# Patient Record
Sex: MALE | Race: WHITE | NOT HISPANIC OR LATINO | Employment: FULL TIME | URBAN - METROPOLITAN AREA
[De-identification: names, ages, dates, MRNs, and addresses within clinical notes are randomized per-mention and may not be internally consistent; named-entity substitution may affect disease eponyms.]

---

## 2019-02-20 ENCOUNTER — CONVERSION ENCOUNTER (OUTPATIENT)
Dept: CARDIOLOGY | Facility: CLINIC | Age: 39
End: 2019-02-20

## 2019-02-20 ENCOUNTER — OFFICE VISIT CONVERTED (OUTPATIENT)
Dept: CARDIOLOGY | Facility: CLINIC | Age: 39
End: 2019-02-20
Attending: INTERNAL MEDICINE

## 2019-08-22 PROBLEM — K21.9 GASTROESOPHAGEAL REFLUX DISEASE, ESOPHAGITIS PRESENCE NOT SPECIFIED: Status: ACTIVE | Noted: 2019-08-22

## 2019-08-22 PROBLEM — I48.91 ATRIAL FIBRILLATION, UNSPECIFIED TYPE (HCC): Status: ACTIVE | Noted: 2019-08-22

## 2019-08-22 PROBLEM — E66.3 OVERWEIGHT (BMI 25.0-29.9): Status: ACTIVE | Noted: 2019-08-22

## 2019-08-23 PROCEDURE — 81001 URINALYSIS AUTO W/SCOPE: CPT | Performed by: FAMILY MEDICINE

## 2020-10-28 ENCOUNTER — OFFICE VISIT CONVERTED (OUTPATIENT)
Dept: FAMILY MEDICINE CLINIC | Facility: CLINIC | Age: 40
End: 2020-10-28
Attending: NURSE PRACTITIONER

## 2020-10-28 ENCOUNTER — HOSPITAL ENCOUNTER (OUTPATIENT)
Dept: LAB | Facility: HOSPITAL | Age: 40
Discharge: HOME | End: 2020-10-28
Attending: NURSE PRACTITIONER

## 2020-10-28 LAB
ALBUMIN SERPL-MCNC: 4.7 G/DL (ref 3.5–5)
ALBUMIN/GLOB SERPL: 1.5 {RATIO} (ref 1.4–2.6)
ALP SERPL-CCNC: 112 U/L (ref 53–128)
ALT SERPL-CCNC: 20 U/L (ref 10–40)
ANION GAP SERPL CALC-SCNC: 16 MMOL/L (ref 8–19)
AST SERPL-CCNC: 21 U/L (ref 15–50)
BASOPHILS # BLD AUTO: 0.06 10*3/UL (ref 0–0.2)
BASOPHILS NFR BLD AUTO: 1 % (ref 0–3)
BILIRUB SERPL-MCNC: 0.48 MG/DL (ref 0.2–1.3)
BUN SERPL-MCNC: 14 MG/DL (ref 5–25)
BUN/CREAT SERPL: 13 {RATIO} (ref 6–20)
CALCIUM SERPL-MCNC: 9.4 MG/DL (ref 8.7–10.4)
CHLORIDE SERPL-SCNC: 104 MMOL/L (ref 99–111)
CHOLEST SERPL-MCNC: 155 MG/DL (ref 107–200)
CHOLEST/HDLC SERPL: 4.2 {RATIO} (ref 3–6)
CONV ABS IMM GRAN: 0.01 10*3/UL (ref 0–0.2)
CONV CO2: 26 MMOL/L (ref 22–32)
CONV IMMATURE GRAN: 0.2 % (ref 0–1.8)
CONV TOTAL PROTEIN: 7.9 G/DL (ref 6.3–8.2)
CREAT UR-MCNC: 1.05 MG/DL (ref 0.7–1.2)
DEPRECATED RDW RBC AUTO: 40.1 FL (ref 35.1–43.9)
EOSINOPHIL # BLD AUTO: 0.14 10*3/UL (ref 0–0.7)
EOSINOPHIL # BLD AUTO: 2.3 % (ref 0–7)
ERYTHROCYTE [DISTWIDTH] IN BLOOD BY AUTOMATED COUNT: 12.1 % (ref 11.6–14.4)
GFR SERPLBLD BASED ON 1.73 SQ M-ARVRAT: >60 ML/MIN/{1.73_M2}
GLOBULIN UR ELPH-MCNC: 3.2 G/DL (ref 2–3.5)
GLUCOSE SERPL-MCNC: 75 MG/DL (ref 70–99)
HCT VFR BLD AUTO: 45.8 % (ref 42–52)
HDLC SERPL-MCNC: 37 MG/DL (ref 40–60)
HGB BLD-MCNC: 15.1 G/DL (ref 14–18)
LDLC SERPL CALC-MCNC: 94 MG/DL (ref 70–100)
LYMPHOCYTES # BLD AUTO: 1.71 10*3/UL (ref 1–5)
LYMPHOCYTES NFR BLD AUTO: 28.1 % (ref 20–45)
MCH RBC QN AUTO: 29.9 PG (ref 27–31)
MCHC RBC AUTO-ENTMCNC: 33 G/DL (ref 33–37)
MCV RBC AUTO: 90.7 FL (ref 80–96)
MONOCYTES # BLD AUTO: 0.6 10*3/UL (ref 0.2–1.2)
MONOCYTES NFR BLD AUTO: 9.9 % (ref 3–10)
NEUTROPHILS # BLD AUTO: 3.57 10*3/UL (ref 2–8)
NEUTROPHILS NFR BLD AUTO: 58.5 % (ref 30–85)
NRBC CBCN: 0 % (ref 0–0.7)
OSMOLALITY SERPL CALC.SUM OF ELEC: 293 MOSM/KG (ref 273–304)
PLATELET # BLD AUTO: 337 10*3/UL (ref 130–400)
PMV BLD AUTO: 8.7 FL (ref 9.4–12.4)
POTASSIUM SERPL-SCNC: 4.2 MMOL/L (ref 3.5–5.3)
RBC # BLD AUTO: 5.05 10*6/UL (ref 4.7–6.1)
SODIUM SERPL-SCNC: 142 MMOL/L (ref 135–147)
TRIGL SERPL-MCNC: 122 MG/DL (ref 40–150)
TSH SERPL-ACNC: 1.71 M[IU]/L (ref 0.27–4.2)
VLDLC SERPL-MCNC: 24 MG/DL (ref 5–37)
WBC # BLD AUTO: 6.09 10*3/UL (ref 4.8–10.8)

## 2020-10-29 LAB — 25(OH)D3 SERPL-MCNC: 27.3 NG/ML (ref 30–100)

## 2021-03-31 ENCOUNTER — OFFICE VISIT CONVERTED (OUTPATIENT)
Dept: FAMILY MEDICINE CLINIC | Facility: CLINIC | Age: 41
End: 2021-03-31
Attending: NURSE PRACTITIONER

## 2021-05-10 NOTE — H&P
History and Physical      Patient Name: Daniele Bowers   Patient ID: [id number]   Sex: Male   YOB: 1980    Primary Care Provider: Penelope GLOVER   Referring Provider: Penelope GLOVER    Visit Date: October 28, 2020    Provider: ADALBERTO Boyer   Location: [facility]   Location Address: [address] [place] [address], [address]  [place], [place]  [id number]   Location Phone: [phone]          Chief Complaint  · New patient - Establish care  · Flu shot      History Of Present Illness  Daniele Bowers is a 40 year old /White male who presents for evaluation and treatment of:      New patient to re-establish care.  Patient previously seen at [facility]".  Patient needs refills on all his medications.    PMH:  GERD, Anxiety, HTN, Afib    GERD:  Takes Omeprazole with good control of symptoms, pt states he does not have to take any supplemental medication, unless he eats something bad.     Anxiety:  Takes Venlafaxine 75mg one daily and 37.5mg one daily  with good control of symptoms.  Patient states he has tried other anxiety medications but always goes back to Venlafaxine d/t it controls anxiety attacks best.    HTN/Afib:  Takes Losartan, Sotalol, ASA.  Patient's BP in clinic today is 120/76.  Patient denies CP, SOA.  Patient was seeing Dr. Rivas, cardiology, in the past.  Patient has not seen him in some time d/t he was living in [place] and recently moved back.  Patient requesting referral. Pt does check  his b/p at home and it runs normal. Pt reports history of A-fib in past. He is currently managed by beta blocker and asa. He denies feeling any palpitations or rapid heart rate.       Past Medical History  Disease Name Date Onset Notes   Allergies --  --    Anxiety --  --    GERD (gastroesophageal reflux disease) --  --    Hypertension --  --          Past Surgical History  Procedure Name Date Notes   Appendectomy --  --          Medication List  Name Date Started Instructions    aspirin 81 mg oral tablet,chewable  chew 1 tablet (81 mg) by oral route once daily   losartan 50 mg oral tablet 10/28/2020 take 1 tablet (50 mg) by oral route once daily for 90 days   omeprazole 40 mg oral capsule,delayed release(DR/EC) 10/28/2020 take 1 capsule (40 mg) by oral route once daily before a meal for 90 days   sotalol 80 mg oral tablet 10/28/2020 take 1 tablet by oral route daily for 90 days   venlafaxine 37.5 mg oral capsule,extended release 24hr 10/28/2020 take 1 capsule (37.5 mg) by oral route once daily with food for 90 days   venlafaxine 75 mg oral capsule,extended release 24hr 10/28/2020 take 1 capsule (75 mg) by oral route once daily for 90 days         Allergy List  Allergen Name Date Reaction Notes   NO KNOWN DRUG ALLERGIES --  --  --        Allergies Reconciled  Family Medical History  Disease Name Relative/Age Notes   Family history of heart disease  --    Family history of diabetes mellitus Father/  Mother/   --          Social History  Finding Status Start/Stop Quantity Notes   Alcohol Never --/-- --  --    Exercises 3 to 4 times per week --  --/-- --  --     --  --/-- --  --    Tobacco Never --/-- --  --          Review of Systems  · Constitutional  o Denies  o : fatigue  · Eyes  o Denies  o : blurred vision, changes in vision  · HENT  o Denies  o : headaches  · Cardiovascular  o Denies  o : chest pain, irregular heart beats, rapid heart rate, dyspnea on exertion  · Respiratory  o Denies  o : shortness of breath, wheezing, cough  · Gastrointestinal  o Denies  o : nausea, vomiting, diarrhea, constipation, abdominal pain, blood in stools, melena  · Genitourinary  o Denies  o : frequency, dysuria, hematuria  · Integument  o Denies  o : rash, new skin lesions  · Musculoskeletal  o Denies  o : joint pain, joint swelling, muscle pain  · Endocrine  o Denies  o : polyuria, polydipsia      Vitals  Date Time BP Position Site L\R Cuff Size HR RR TEMP (F) WT  HT  BMI kg/m2 BSA m2 O2 Sat FR  "L/min FiO2 HC       10/28/2020 08:56 /76 Sitting    57 - R  97.8 223lbs 4oz 6'  1\" 29.45 2.28 100 %  21%          Physical Examination  · Constitutional  o Appearance  o : well developed, well-nourished, no acute distress  · Head and Face  o Head  o : normocephalic, atraumatic  · Neck  o Inspection/Palpation  o : normal appearance, no masses or tenderness, trachea midline  o Thyroid  o : gland size normal, nontender, no nodules or masses present on palpation  · Respiratory  o Respiratory Effort  o : breathing unlabored  o Inspection of Chest  o : chest rise symmetric bilaterally  o Auscultation of Lungs  o : clear to auscultation bilaterally throughout inspiration and expiration  · Cardiovascular  o Heart  o :   § Auscultation of Heart  § : regular rate and rhythm, no murmurs, gallops or rubs  o Peripheral Vascular System  o :   § Extremities  § : no edema  · Lymphatic  o Neck  o : no cervical lymphadenopathy, no supraclavicular lymphadenopathy  · Psychiatric  o Mood and Affect  o : mood normal, affect appropriate          Assessment  · Screening for depression     V79.0/Z13.89  · Need for influenza vaccination     V04.81/Z23  · Screening for lipid disorders     V77.91/Z13.220  · Anxiety disorder     300.00/F41.9  · Essential hypertension     401.9/I10  · GERD (gastroesophageal reflux disease)     530.81/K21.9  · Establishing care with new doctor, encounter for     V65.8/Z76.89  · Routine lab draw     V72.60/Z01.89  · Atrial fibrillation     427.31/I48.91    Problems Reconciled  Plan  · Orders  o ACO-18: Negative screen for clinical depression using a standardized tool () - V79.0/Z13.89 - 10/28/2020   0  o Immunization Admin Fee (Single) ([facility]) (92592) - V04.81/Z23 - 10/28/2020  o Fluzone Quadrivalent Vaccine, age 6 months + (17615) - V04.81/Z23 - 10/28/2020   Vaccine - Influenza; Dose: 0.5; Site: Right Upper Arm; Route: Intramuscular; Date: 10/28/2020 09:26:00; Exp: 06/30/2021; Lot: [id number]; Mfg: [employer] " pasteur; TradeName: Fluzone Quadrivalent; Administered By: Thor Garrido MA; Comment: Patient tolerated injection well with no adverse reaction.  o ACO-14: Influenza immunization administered or previously received () - V04.81/Z23 - 10/28/2020  o Physical, Primary Care Panel (CBC, CMP, Lipid, TSH) [facility] (77586, 69086, 92544, 81250) - V72.60/Z01.89, V65.8/Z76.89, V77.91/Z13.220, 401.9/I10 - 10/28/2020  o ACO-39: Current medications updated and reviewed (1159F, ) - - 10/28/2020  o ACO-18: Negative screen for clinical depression using a standardized tool () - - 10/28/2020  o ACO-14: Influenza immunization administered or previously received [facility] () - - 10/28/2020  o CARDIOLOGY CONSULTATION (CARDI) - 401.9/I10 - 10/28/2020   Dr. IMAN Rivas, [facility]  · Medications  o losartan 50 mg oral tablet   SIG: take 1 tablet (50 mg) by oral route once daily for 90 days   DISP: (90) Tablet with 0 refills  Prescribed on 10/28/2020     o omeprazole 40 mg oral capsule,delayed release(/EC)   SIG: take 1 capsule (40 mg) by oral route once daily before a meal for 90 days   DISP: (90) Capsule with 0 refills  Prescribed on 10/28/2020     o sotalol 80 mg oral tablet   SIG: take 1 tablet by oral route daily for 90 days   DISP: (90) Tablet with 0 refills  Prescribed on 10/28/2020     o venlafaxine 37.5 mg oral capsule,extended release 24hr   SIG: take 2 capsules (75 mg) by oral route once daily for 90 days   DISP: (180) Capsule with 0 refills  Prescribed on 10/28/2020     o venlafaxine 75 mg oral capsule,extended release 24hr   SIG: take 1 capsule (75 mg) by oral route once daily for 90 days   DISP: (90) Capsule with 0 refills  Prescribed on 10/28/2020     o Medications have been Reconciled  o Transition of Care or Provider Policy  · Instructions  o Depression Screen completed and scanned into the EMR under the designated folder within the patient's documents.  o Today's PHQ-9 result is _0__  o Patient advised  to monitor blood pressure (B/P) at home and journal readings. Patient informed that a B/P reading at home of more than 130/80 is considered hypertension. For readings greater rbng825/90 or higher patient is advised to follow up in the office with readings for management. Patient advised to limit sodium intake.  o Patient is taking medications as prescribed and doing well.   o Patient was educated/instructed on their diagnosis, treatment and medications prior to discharge from the clinic today.  o Call the office with any concerns or questions.  o Discussed Covid-19 precautions including, but not limited to, social distancing, avoid touching your face, and hand washing.   o Electronically Identified Patient Education Materials Provided Electronically  · Disposition  o Follow Up in 6 months  o Care Transition  o SCOTTIE Sent            Electronically Signed by: ADALBERTO Boyer -Author on October 28, 2020 09:49:36 AM

## 2021-05-14 VITALS
OXYGEN SATURATION: 100 % | HEIGHT: 73 IN | DIASTOLIC BLOOD PRESSURE: 76 MMHG | SYSTOLIC BLOOD PRESSURE: 120 MMHG | TEMPERATURE: 97.8 F | WEIGHT: 223.25 LBS | HEART RATE: 57 BPM | BODY MASS INDEX: 29.59 KG/M2

## 2021-05-14 VITALS
BODY MASS INDEX: 28.83 KG/M2 | HEIGHT: 73 IN | TEMPERATURE: 97.4 F | SYSTOLIC BLOOD PRESSURE: 101 MMHG | DIASTOLIC BLOOD PRESSURE: 63 MMHG | OXYGEN SATURATION: 98 % | WEIGHT: 217.5 LBS | HEART RATE: 54 BPM

## 2021-05-14 NOTE — PROGRESS NOTES
Progress Note      Patient Name: Daniele Bowers   Patient ID: [id number]   Sex: Male   YOB: 1980    Primary Care Provider: Pneelope GLOVER   Referring Provider: Penelope GLOVER    Visit Date: March 31, 2021    Provider: ADALBERTO Boyer   Location: [facility]   Location Address: [address] [place] [address], [address]  [place], [place]  [id number]   Location Phone: [phone]          Chief Complaint  · Follow up - HTN/Afib, Anxiety, GERD      History Of Present Illness  Daniele Bowers is a 40 year old /White male who presents for evaluation and treatment of:      Follow up on HTN/Afib, Anxiety, GERD for medication refills.    HTN/Afib:  Takes Losartan, Sotalol, ASA.  Patient's BP in clinic today is 101/63.  Patient monitors BP at home with readings of 110s/70s.  Patient denies CP, SOA.    Anxiety:  Takes Venlafaxine with good control of symptoms.  pt currently on 75mg and one 37.5mg daily and states this controls his symptoms well.     GERD:  Takes Omeprazole with good control of symptoms.  Patient never has to take OTC medications for break through reflux.    pt is doing well on all medications and has no complaints.           Past Medical History  Disease Name Date Onset Notes   Allergies --  --    Anxiety --  --    GERD (gastroesophageal reflux disease) --  --    Hypertension --  --          Past Surgical History  Procedure Name Date Notes   Appendectomy --  --          Medication List  Name Date Started Instructions   aspirin 81 mg oral tablet,chewable  chew 1 tablet (81 mg) by oral route once daily   losartan 50 mg oral tablet 03/31/2021 take 1 tablet (50 mg) by oral route once daily for 90 days   omeprazole 40 mg oral capsule,delayed release(DR/EC) 03/31/2021 take 1 capsule (40 mg) by oral route once daily before a meal for 90 days   sotalol 80 mg oral tablet 03/31/2021 take 1 tablet by oral route daily for 90 days   venlafaxine 37.5 mg oral capsule,extended release  "24hr 03/31/2021 take 1 capsule (37.5 mg) by oral route once daily with food for 90 days   venlafaxine 75 mg oral capsule,extended release 24hr 03/31/2021 take 1 capsule (75 mg) by oral route once daily for 90 days         Allergy List  Allergen Name Date Reaction Notes   NO KNOWN DRUG ALLERGIES --  --  --        Allergies Reconciled  Family Medical History  Disease Name Relative/Age Notes   Family history of heart disease  --    Family history of diabetes mellitus Father/  Mother/   --          Social History  Finding Status Start/Stop Quantity Notes   Alcohol Never --/-- --  --    Exercises 3 to 4 times per week --  --/-- --  --     --  --/-- --  --    Tobacco Never --/-- --  --          Immunizations  NameDate Admin Mfg Trade Name Lot Number Route Inj VIS Given VIS Publication   Ccwaieuwo00/28/2020 [facility] Fluzone Quadrivalent [id number] IM  10/28/2020 08/15/2019   Comments: Patient tolerated injection well with no adverse reaction.         Review of Systems  · Constitutional  o Denies  o : fatigue  · Eyes  o Denies  o : blurred vision, changes in vision  · HENT  o Denies  o : headaches  · Cardiovascular  o Denies  o : chest pain, irregular heart beats, rapid heart rate, dyspnea on exertion  · Respiratory  o Denies  o : shortness of breath, wheezing, cough  · Gastrointestinal  o Denies  o : nausea, vomiting, diarrhea, constipation, abdominal pain, blood in stools, melena  · Genitourinary  o Denies  o : frequency, dysuria, hematuria  · Integument  o Denies  o : rash, new skin lesions  · Musculoskeletal  o Denies  o : joint pain, joint swelling, muscle pain  · Endocrine  o Denies  o : polyuria, polydipsia      Vitals  Date Time BP Position Site L\R Cuff Size HR RR TEMP (F) WT  HT  BMI kg/m2 BSA m2 O2 Sat FR L/min FiO2 HC       10/28/2020 08:56 /76 Sitting    57 - R  97.8 223lbs 4oz 6'  1\" 29.45 2.28 100 %  21%    03/31/2021 07:56 /63 Sitting    54 - R  97.4 217lbs 8oz 6'  1\" 28.7 2.25 98 %  21%  "         Physical Examination  · Constitutional  o Appearance  o : well developed, well-nourished, no acute distress  · Head and Face  o Head  o : normocephalic, atraumatic  · Neck  o Inspection/Palpation  o : normal appearance, no masses or tenderness, trachea midline  o Thyroid  o : gland size normal, nontender, no nodules or masses present on palpation  · Respiratory  o Respiratory Effort  o : breathing unlabored  o Inspection of Chest  o : chest rise symmetric bilaterally  o Auscultation of Lungs  o : clear to auscultation bilaterally throughout inspiration and expiration  · Cardiovascular  o Heart  o :   § Auscultation of Heart  § : regular rate and rhythm, no murmurs, gallops or rubs  o Peripheral Vascular System  o :   § Extremities  § : no edema  · Lymphatic  o Neck  o : no cervical lymphadenopathy, no supraclavicular lymphadenopathy  · Psychiatric  o Mood and Affect  o : mood normal, affect appropriate          Assessment  · Anxiety disorder     300.00/F41.9  · Essential hypertension     401.9/I10  · GERD (gastroesophageal reflux disease)     530.81/K21.9  · Afib     427.31/I48.91    Problems Reconciled  Plan  · Orders  o ACO-14: Influenza immunization administered or previously received [facility] () - - 03/31/2021  o ACO-39: Current medications updated and reviewed (1159F, ) - - 03/31/2021  · Medications  o losartan 50 mg oral tablet   SIG: take 1 tablet (50 mg) by oral route once daily for 90 days   DISP: (90) Tablet with 1 refills  Refilled on 03/31/2021     o omeprazole 40 mg oral capsule,delayed release(DR/EC)   SIG: take 1 capsule (40 mg) by oral route once daily before a meal for 90 days   DISP: (90) Capsule with 1 refills  Refilled on 03/31/2021     o sotalol 80 mg oral tablet   SIG: take 1 tablet by oral route daily for 90 days   DISP: (90) Tablet with 1 refills  Refilled on 03/31/2021     o venlafaxine 37.5 mg oral capsule,extended release 24hr   SIG: take 1 capsule (37.5 mg) by oral route once  daily with food for 90 days   DISP: (90) Capsule with 1 refills  Refilled on 03/31/2021     o venlafaxine 75 mg oral capsule,extended release 24hr   SIG: take 1 capsule (75 mg) by oral route once daily for 90 days   DISP: (90) Capsule with 1 refills  Refilled on 03/31/2021     o Medications have been Reconciled  o Transition of Care or Provider Policy  · Instructions  o Patient advised to monitor blood pressure (B/P) at home and journal readings. Patient informed that a B/P reading at home of more than 130/80 is considered hypertension. For readings greater hqvl301/90 or higher patient is advised to follow up in the office with readings for management. Patient advised to limit sodium intake.  o Patient is taking medications as prescribed and doing well.   o Patient was educated/instructed on their diagnosis, treatment and medications prior to discharge from the clinic today.  o Call the office with any concerns or questions.  o Discussed Covid-19 precautions including, but not limited to, social distancing, avoid touching your face, and hand washing.   o Electronically Identified Patient Education Materials Provided Electronically  · Disposition  o Follow Up in 6 months            Electronically Signed by: Penelope An APRN -Author on March 31, 2021 08:21:52 AM

## 2021-05-15 VITALS
BODY MASS INDEX: 26.77 KG/M2 | HEIGHT: 73 IN | SYSTOLIC BLOOD PRESSURE: 120 MMHG | HEART RATE: 66 BPM | WEIGHT: 202 LBS | DIASTOLIC BLOOD PRESSURE: 78 MMHG

## 2021-05-26 ENCOUNTER — OFFICE VISIT CONVERTED (OUTPATIENT)
Dept: CARDIOLOGY | Facility: CLINIC | Age: 41
End: 2021-05-26
Attending: NURSE PRACTITIONER

## 2021-05-26 ENCOUNTER — CONVERSION ENCOUNTER (OUTPATIENT)
Dept: CARDIOLOGY | Facility: CLINIC | Age: 41
End: 2021-05-26

## 2021-06-05 NOTE — PROGRESS NOTES
"   Progress Note      Patient Name: Daniele Bowers   Patient ID: [id number]   Sex: Male   YOB: 1980    Primary Care Provider: Penelope GLOVER   Referring Provider: Penelope GLOVER    Visit Date: May 26, 2021    Provider: ADALBERTO Prakash   Location: [facility]   Location Address: [address] [place] [address], [address]   [place], [place]  [id number]   Location Phone: [phone]          Chief Complaint     Follow-up on atrial arrhythmia.       History Of Present Illness  REFERRING CARE PROVIDER: Penelope GLOVER   Daniele Bowers is a 40 year old /White male who has not been in the office since February 2019. He comes in and denies any palpitations or irregular beats. No chest pain, shortness of breath, swelling, dizziness, syncope, PND, or orthopnea. Cardiac wise he is feeling good. He is following a low fat cholesterol diet, even though he admits that he has gained 12 pounds since his last visit and he has already had his COVID vaccine.   PAST MEDICAL HISTORY: Hypertension; paroxysmal atrial fibrillation.   PSYCHOSOCIAL HISTORY: Denies alcohol or tobacco use.   CURRENT MEDICATIONS:Sotalol 80 mg 1/2 b.i.d.; omeprazole 40 mg daily; aspirin 81 mg daily; venlafaxine 75 mg daily; losartan 50 mg daily.      ALLERGIES:  No known drug allergies.       Review of Systems  · Cardiovascular  o Denies  o : palpitations (fast, fluttering, or skipping beats), swelling (feet, ankles, hands), shortness of breath while walking or lying flat, chest pain or angina pectoris   · Respiratory  o Denies  o : chronic or frequent cough      Vitals  Date Time BP Position Site L\R Cuff Size HR RR TEMP (F) WT  HT  BMI kg/m2 BSA m2 O2 Sat FR L/min FiO2 HC       05/26/2021 11:09 /82 Sitting    61 - R   213lbs 16oz 6'  1\" 28.23 2.24             Physical Examination  · Constitutional  o Appearance  o : Awake, alert, in no acute distress.   · Eyes  o Conjunctivae  o : Normal.  · Ears, Nose, Mouth and Throat  o Oral " Cavity  o :   § Oral Mucosa  § : Normal.  · Neck  o Inspection/Palpation  o : No JVD. Good carotid upstroke. No thyromegaly.  · Respiratory  o Respiratory  o : Good respiratory effort. Clear to percussion and auscultation.  · Cardiovascular  o Heart  o :   § Auscultation of Heart  § : Sotalol 80 mg 1/2 b.i.d.; omeprazole 40 mg daily; aspirin 81 mg daily; venlafaxine 75 mg daily; losartan 50 mg daily/  o Peripheral Vascular System  o :   § Extremities  § : Good femoral and pedal pulses. No pedal edema.  · Gastrointestinal  o Abdominal Examination  o : Soft. No tenderness or masses felt. No hepatosplenomegaly. Abdominal aorta is not palpable.  · EKG  o EKG  o : Was performed in the office today  o Indications  o : To monitor ihs sotalol.  o Results  o : Sinus rhythm at a rate of 58.  o Comparison  o : No signifigant change since previous EKG of February 2019.  · Labs  o Labs  o : From primary care physician, blood sugar at 75, total cholesterol is 155, triglycerides are 122, HDL is always low at 37, but the LDL dropped to 94.           Assessment     1.  Paroxysmal atrial fibrillation, currently in sinus.  2.  Hypertension, well controlled.  3.  Lipids are controlled on diet and lifestyle.         Plan     1.  Continue sotalol in view of his paroxysmal atrial arrhythmias.  2.  Continue the aspirin for secondary prevention.  3.  Continue the losartan for his hypertension.  4.  He will follow-up in 8-9 months with EKG and labs from primary care provider.         ADALBERTO Mata  JF:vh                Electronically Signed by: Phylicia Lal-, OT -Author on May 28, 2021 02:17:58 PM  Electronically Co-signed by: ADALBERTO Prakash -Reviewer on May 30, 2021 10:00:52 AM

## 2021-07-15 VITALS
HEART RATE: 61 BPM | WEIGHT: 214 LBS | SYSTOLIC BLOOD PRESSURE: 118 MMHG | HEIGHT: 73 IN | DIASTOLIC BLOOD PRESSURE: 82 MMHG | BODY MASS INDEX: 28.36 KG/M2

## 2021-10-14 RX ORDER — VENLAFAXINE HYDROCHLORIDE 37.5 MG/1
CAPSULE, EXTENDED RELEASE ORAL
Qty: 90 CAPSULE | Refills: 0 | Status: SHIPPED | OUTPATIENT
Start: 2021-10-14 | End: 2022-01-11 | Stop reason: SDUPTHER

## 2021-10-14 RX ORDER — LOSARTAN POTASSIUM 50 MG/1
TABLET ORAL
Qty: 90 TABLET | Refills: 0 | Status: SHIPPED | OUTPATIENT
Start: 2021-10-14 | End: 2022-01-11 | Stop reason: SDUPTHER

## 2021-11-23 RX ORDER — VENLAFAXINE HYDROCHLORIDE 75 MG/1
CAPSULE, EXTENDED RELEASE ORAL
Qty: 90 CAPSULE | Refills: 0 | Status: SHIPPED | OUTPATIENT
Start: 2021-11-23 | End: 2022-01-11 | Stop reason: SDUPTHER

## 2021-12-10 RX ORDER — SOTALOL HYDROCHLORIDE 80 MG/1
TABLET ORAL DAILY
Qty: 90 TABLET | Refills: 0 | Status: SHIPPED | OUTPATIENT
Start: 2021-12-10 | End: 2022-01-25 | Stop reason: SDUPTHER

## 2022-01-10 RX ORDER — OMEPRAZOLE 40 MG/1
CAPSULE, DELAYED RELEASE ORAL
Qty: 30 CAPSULE | Refills: 0 | OUTPATIENT
Start: 2022-01-10

## 2022-01-10 RX ORDER — VENLAFAXINE HYDROCHLORIDE 37.5 MG/1
CAPSULE, EXTENDED RELEASE ORAL
Qty: 30 CAPSULE | Refills: 0 | OUTPATIENT
Start: 2022-01-10

## 2022-01-10 RX ORDER — LOSARTAN POTASSIUM 50 MG/1
TABLET ORAL
Qty: 30 TABLET | Refills: 0 | OUTPATIENT
Start: 2022-01-10

## 2022-01-10 NOTE — TELEPHONE ENCOUNTER
Patient has no follow up with you and has not been seen since March 2021. He has cancelled his last two appts.

## 2022-01-11 NOTE — TELEPHONE ENCOUNTER
Caller: Daniele Bowers    Relationship: Self    Best call back number: [phone]    Requested Prescriptions:   Requested Prescriptions     Pending Prescriptions Disp Refills   • losartan (COZAAR) 50 MG tablet 90 tablet 0   • omeprazole (priLOSEC) 40 MG capsule       Sig: Take 1 capsule by mouth Daily.   • venlafaxine XR (EFFEXOR-XR) 75 MG 24 hr capsule 90 capsule 0   • venlafaxine XR (EFFEXOR-XR) 37.5 MG 24 hr capsule 90 capsule 0     Refused Prescriptions Disp Refills   • omeprazole (priLOSEC) 40 MG capsule [Pharmacy Med Name: OMEPRAZOLE 40MG CAPSULES] 30 capsule 0     Sig: TAKE 1 CAPSULE(40 MG) BY MOUTH EVERY DAY BEFORE A MEAL     Refused By: JUNIOR STEVE     Reason for Refusal: Patient needs an appointment   • venlafaxine XR (EFFEXOR-XR) 37.5 MG 24 hr capsule [Pharmacy Med Name: VENLAFAXINE ER 37.5MG CAPSULES] 30 capsule 0     Sig: TAKE 1 CAPSULE(37.5 MG) BY MOUTH EVERY DAY WITH FOOD     Refused By: JUNIOR STEVE     Reason for Refusal: Patient needs an appointment   • losartan (COZAAR) 50 MG tablet [Pharmacy Med Name: LOSARTAN 50MG TABLETS] 30 tablet 0     Sig: TAKE 1 TABLET(50 MG) BY MOUTH EVERY DAY     Refused By: JUNIOR STEVE     Reason for Refusal: Patient needs an appointment        [id number] - [place], [place] - [address] AT [facility] [phone] [facility] [phone] FX     Additional details provided by patient: THE PATIENT STATED HE IS ALMOST OUT OF THESE MEDICATIONS. HE HAS SCHEDULED THE FIRST AVAILABLE APPOINTMENT WITH PCP AND NEEDS A REFILL OF BOTH OF HIS VENLAFAXINES ASAP    Does the patient have less than a 3 day supply:  [x] Yes  [] No    Elliott Tabor Rep   01/11/22 13:31 EST

## 2022-01-13 RX ORDER — OMEPRAZOLE 40 MG/1
40 CAPSULE, DELAYED RELEASE ORAL DAILY
Qty: 30 CAPSULE | Refills: 0 | Status: SHIPPED | OUTPATIENT
Start: 2022-01-13 | End: 2022-01-25 | Stop reason: SDUPTHER

## 2022-01-13 RX ORDER — VENLAFAXINE HYDROCHLORIDE 37.5 MG/1
37.5 CAPSULE, EXTENDED RELEASE ORAL DAILY
Qty: 30 CAPSULE | Refills: 0 | Status: SHIPPED | OUTPATIENT
Start: 2022-01-13 | End: 2022-01-25 | Stop reason: SDUPTHER

## 2022-01-13 RX ORDER — LOSARTAN POTASSIUM 50 MG/1
50 TABLET ORAL DAILY
Qty: 30 TABLET | Refills: 0 | Status: SHIPPED | OUTPATIENT
Start: 2022-01-13 | End: 2022-01-25 | Stop reason: SDUPTHER

## 2022-01-13 RX ORDER — VENLAFAXINE HYDROCHLORIDE 75 MG/1
75 CAPSULE, EXTENDED RELEASE ORAL DAILY
Qty: 30 CAPSULE | Refills: 0 | Status: SHIPPED | OUTPATIENT
Start: 2022-01-13 | End: 2022-01-25 | Stop reason: SDUPTHER

## 2022-01-25 ENCOUNTER — OFFICE VISIT (OUTPATIENT)
Dept: FAMILY MEDICINE CLINIC | Facility: CLINIC | Age: 42
End: 2022-01-25

## 2022-01-25 VITALS
WEIGHT: 221 LBS | DIASTOLIC BLOOD PRESSURE: 71 MMHG | SYSTOLIC BLOOD PRESSURE: 116 MMHG | BODY MASS INDEX: 29.29 KG/M2 | HEART RATE: 60 BPM | OXYGEN SATURATION: 98 % | TEMPERATURE: 97.6 F | HEIGHT: 73 IN

## 2022-01-25 DIAGNOSIS — K21.9 GASTROESOPHAGEAL REFLUX DISEASE WITHOUT ESOPHAGITIS: ICD-10-CM

## 2022-01-25 DIAGNOSIS — Z12.5 PROSTATE CANCER SCREENING: ICD-10-CM

## 2022-01-25 DIAGNOSIS — I48.91 ATRIAL FIBRILLATION, UNSPECIFIED TYPE: ICD-10-CM

## 2022-01-25 DIAGNOSIS — F41.9 ANXIETY: ICD-10-CM

## 2022-01-25 DIAGNOSIS — I10 ESSENTIAL HYPERTENSION: Primary | ICD-10-CM

## 2022-01-25 DIAGNOSIS — R10.12 LEFT UPPER QUADRANT ABDOMINAL PAIN: ICD-10-CM

## 2022-01-25 DIAGNOSIS — Z11.59 NEED FOR HEPATITIS C SCREENING TEST: ICD-10-CM

## 2022-01-25 DIAGNOSIS — Z13.29 SCREENING FOR THYROID DISORDER: ICD-10-CM

## 2022-01-25 DIAGNOSIS — Z23 NEED FOR IMMUNIZATION AGAINST INFLUENZA: ICD-10-CM

## 2022-01-25 PROBLEM — I48.0 PAROXYSMAL ATRIAL FIBRILLATION (HCC): Status: ACTIVE | Noted: 2022-01-25

## 2022-01-25 PROBLEM — I48.0 PAROXYSMAL ATRIAL FIBRILLATION: Chronic | Status: ACTIVE | Noted: 2022-01-25

## 2022-01-25 PROCEDURE — 90471 IMMUNIZATION ADMIN: CPT | Performed by: NURSE PRACTITIONER

## 2022-01-25 PROCEDURE — 90686 IIV4 VACC NO PRSV 0.5 ML IM: CPT | Performed by: NURSE PRACTITIONER

## 2022-01-25 PROCEDURE — 99214 OFFICE O/P EST MOD 30 MIN: CPT | Performed by: NURSE PRACTITIONER

## 2022-01-25 RX ORDER — VENLAFAXINE HYDROCHLORIDE 37.5 MG/1
37.5 CAPSULE, EXTENDED RELEASE ORAL DAILY
Qty: 90 CAPSULE | Refills: 1 | Status: SHIPPED | OUTPATIENT
Start: 2022-01-25 | End: 2022-08-04 | Stop reason: SDUPTHER

## 2022-01-25 RX ORDER — VENLAFAXINE HYDROCHLORIDE 75 MG/1
75 CAPSULE, EXTENDED RELEASE ORAL DAILY
Qty: 90 CAPSULE | Refills: 1 | Status: SHIPPED | OUTPATIENT
Start: 2022-01-25 | End: 2022-08-04 | Stop reason: SDUPTHER

## 2022-01-25 RX ORDER — SOTALOL HYDROCHLORIDE 80 MG/1
80 TABLET ORAL DAILY
Qty: 90 TABLET | Refills: 1 | Status: SHIPPED | OUTPATIENT
Start: 2022-01-25 | End: 2022-01-26

## 2022-01-25 RX ORDER — OMEPRAZOLE 40 MG/1
40 CAPSULE, DELAYED RELEASE ORAL DAILY
Qty: 90 CAPSULE | Refills: 1 | Status: SHIPPED | OUTPATIENT
Start: 2022-01-25 | End: 2022-08-04 | Stop reason: SDUPTHER

## 2022-01-25 RX ORDER — LOSARTAN POTASSIUM 50 MG/1
50 TABLET ORAL DAILY
Qty: 90 TABLET | Refills: 1 | Status: SHIPPED | OUTPATIENT
Start: 2022-01-25 | End: 2022-08-04 | Stop reason: SDUPTHER

## 2022-01-25 NOTE — PROGRESS NOTES
Chief Complaint  Follow-up, Hypertension, Atrial Fibrillation, Anxiety, and Heartburn    Subjective          Daniele Bowers presents to [facility]  History of Present Illness    Hypertension/Afib:  Patient is taking Losartan, Sotalol, ASA.  Patient's Blood Pressure in clinic today is 116/71.  Patient monitors blood pressure at home with readings of 120s/70s-80s.  Patient denies chest pain, shortness of air, headache, flushing, abnormal swelling in feet/ankles.  Patient does note an intermittent sharp pain under left rib cage.  Patient's cardiologist is Dr. Rivas, he has follow up appointment tomorrow.    Anxiety:  Patient is taking Venlafaxine with good control of symptoms.    Gastroesophageal Reflux:  Patient is taking Omeprazole, with good control of symptoms.  Patient does not need over the counter medications for breakthrough symptoms.  Patient tries to avoid trigger foods, eat frequent small meals, not lie down within 2 hours of eating, avoids NSAIDS medications and alcohol.    Pt does report pain in the upper left quadrant, intermittently and random. It may occur daily and then skip days. He states when it does occur it lasts all day. He rates the pain as 2/10. He does state it is worse after dairy products.  He states this has been going on for about 6 months.    Past Medical History:   Diagnosis Date   • Allergies    • Anxiety    • Atrial fibrillation (HCC)    • GERD (gastroesophageal reflux disease)    • Hypertension          No Known Allergies       Past Surgical History:   Procedure Laterality Date   • APPENDECTOMY            Social History     Tobacco Use   • Smoking status: Never Smoker   • Smokeless tobacco: Never Used   Substance Use Topics   • Alcohol use: Never         Family History   Problem Relation Age of Onset   • Diabetes Mother    • Diabetes Father    • Heart disease Other           Current Outpatient Medications on File Prior to Visit   Medication Sig   •  "aspirin 81 MG chewable tablet Chew 1 tablet Daily.   • [DISCONTINUED] losartan (COZAAR) 50 MG tablet Take 1 tablet by mouth Daily.   • [DISCONTINUED] omeprazole (priLOSEC) 40 MG capsule Take 1 capsule by mouth Daily.   • [DISCONTINUED] sotalol (BETAPACE) 80 MG tablet TAKE 1 TABLET BY MOUTH DAILY   • [DISCONTINUED] venlafaxine XR (EFFEXOR-XR) 37.5 MG 24 hr capsule Take 1 capsule by mouth Daily.   • [DISCONTINUED] venlafaxine XR (EFFEXOR-XR) 75 MG 24 hr capsule Take 1 capsule by mouth Daily.   • [DISCONTINUED] venlafaxine 37.5 MG tablet sustained-release 24 hour 24 hr tablet Take 1 tablet by mouth Daily.   • [DISCONTINUED] venlafaxine 75 MG tablet sustained-release 24 hour 24 hr tablet Take 1 tablet by mouth Daily.     No current facility-administered medications on file prior to visit.         Immunization History   Administered Date(s) Administered   • COVID-19 ([employer] Displays) 04/12/2021   • Flu Vaccine Quad PF >18YRS 10/28/2020   • FluLaval/Fluarix/Fluzone >6 01/25/2022   • Influenza, Unspecified 10/28/2020   • Tdap 11/03/2021         /71 (BP Location: Left arm, Patient Position: Sitting, Cuff Size: Adult)   Pulse 60   Temp 97.6 °F (36.4 °C) (Oral)   Ht 185.4 cm (73\")   Wt 100 kg (221 lb)   SpO2 98%   BMI 29.16 kg/m²             Physical Exam  Vitals reviewed.   Constitutional:       Appearance: Normal appearance. He is well-developed.   HENT:      Head: Normocephalic and atraumatic.      Right Ear: External ear normal.      Left Ear: External ear normal.      Mouth/Throat:      Pharynx: No oropharyngeal exudate.   Eyes:      Conjunctiva/sclera: Conjunctivae normal.      Pupils: Pupils are equal, round, and reactive to light.   Cardiovascular:      Rate and Rhythm: Normal rate and regular rhythm.      Heart sounds: No murmur heard.  No friction rub. No gallop.    Pulmonary:      Effort: Pulmonary effort is normal.      Breath sounds: Normal breath sounds. No wheezing or rhonchi.   Abdominal:      Tenderness: " There is abdominal tenderness in the left upper quadrant.   Skin:     General: Skin is warm and dry.   Neurological:      Mental Status: He is alert and oriented to person, place, and time.      Cranial Nerves: No cranial nerve deficit.   Psychiatric:         Mood and Affect: Mood and affect normal.         Behavior: Behavior normal.         Thought Content: Thought content normal.         Judgment: Judgment normal.             Result Review :                           Assessment and Plan      Diagnoses and all orders for this visit:    1. Essential hypertension (Primary)  Comments:  well controlled, cont current medications  Orders:  -     Comprehensive Metabolic Panel; Future  -     CBC & Differential; Future  -     Lipid Panel; Future  -     losartan (COZAAR) 50 MG tablet; Take 1 tablet by mouth Daily.  Dispense: 90 tablet; Refill: 1    2. Atrial fibrillation, unspecified type (HCC)  -     sotalol (BETAPACE) 80 MG tablet; Take 1 tablet by mouth Daily.  Dispense: 90 tablet; Refill: 1    3. Gastroesophageal reflux disease without esophagitis  Comments:  well controlled, cont current medications  Orders:  -     omeprazole (priLOSEC) 40 MG capsule; Take 1 capsule by mouth Daily.  Dispense: 90 capsule; Refill: 1    4. Anxiety  Comments:  well controlled, cont current medications  Orders:  -     venlafaxine XR (EFFEXOR-XR) 37.5 MG 24 hr capsule; Take 1 capsule by mouth Daily.  Dispense: 90 capsule; Refill: 1  -     venlafaxine XR (EFFEXOR-XR) 75 MG 24 hr capsule; Take 1 capsule by mouth Daily.  Dispense: 90 capsule; Refill: 1    5. Need for hepatitis C screening test  -     Hepatitis C antibody; Future    6. Screening for thyroid disorder  -     TSH; Future    7. Need for immunization against influenza  -     Flulaval/Fluarix/Fluzone >6 Months ([id number])    8. Left upper quadrant abdominal pain  -     Helicobacter Pylori, IgA IgG IgM; Future  -     Amylase; Future  -     Lipase; Future    9. Prostate cancer  screening  -     PSA Screen      “Discussed risks/benefits to vaccination, reviewed components of the vaccine, discussed VIS, discussed informed consent, informed consent obtained. Patient/Parent was allowed to accept or refuse vaccine. Questions answered to satisfactory state of patient/Parent. We reviewed typical age appropriate and seasonally appropriate vaccinations. Reviewed immunization history and updated state vaccination form as needed. Patient was counseled on Influenza            Follow Up     Return in about 6 months (around 7/25/2022).    Patient was given instructions and counseling regarding his condition or for health maintenance advice. Please see specific information pulled into the AVS if appropriate.

## 2022-01-26 ENCOUNTER — OFFICE VISIT (OUTPATIENT)
Dept: CARDIOLOGY | Facility: CLINIC | Age: 42
End: 2022-01-26

## 2022-01-26 ENCOUNTER — LAB (OUTPATIENT)
Dept: LAB | Facility: HOSPITAL | Age: 42
End: 2022-01-26

## 2022-01-26 VITALS
DIASTOLIC BLOOD PRESSURE: 84 MMHG | HEIGHT: 73 IN | WEIGHT: 223 LBS | HEART RATE: 59 BPM | SYSTOLIC BLOOD PRESSURE: 132 MMHG | BODY MASS INDEX: 29.55 KG/M2

## 2022-01-26 DIAGNOSIS — I10 PRIMARY HYPERTENSION: ICD-10-CM

## 2022-01-26 DIAGNOSIS — I48.91 ATRIAL FIBRILLATION, UNSPECIFIED TYPE: ICD-10-CM

## 2022-01-26 DIAGNOSIS — I48.0 PAROXYSMAL ATRIAL FIBRILLATION: Primary | ICD-10-CM

## 2022-01-26 DIAGNOSIS — R10.12 LEFT UPPER QUADRANT ABDOMINAL PAIN: ICD-10-CM

## 2022-01-26 DIAGNOSIS — Z11.59 NEED FOR HEPATITIS C SCREENING TEST: ICD-10-CM

## 2022-01-26 DIAGNOSIS — I10 ESSENTIAL HYPERTENSION: ICD-10-CM

## 2022-01-26 DIAGNOSIS — Z13.29 SCREENING FOR THYROID DISORDER: ICD-10-CM

## 2022-01-26 DIAGNOSIS — Z79.899 MEDICATION MANAGEMENT: ICD-10-CM

## 2022-01-26 LAB
ALBUMIN SERPL-MCNC: 4.6 G/DL (ref 3.5–5.2)
ALBUMIN/GLOB SERPL: 1.4 G/DL
ALP SERPL-CCNC: 100 U/L (ref 39–117)
ALT SERPL W P-5'-P-CCNC: 19 U/L (ref 1–41)
AMYLASE SERPL-CCNC: 31 U/L (ref 28–100)
ANION GAP SERPL CALCULATED.3IONS-SCNC: 8.9 MMOL/L (ref 5–15)
AST SERPL-CCNC: 21 U/L (ref 1–40)
BASOPHILS # BLD AUTO: 0.05 10*3/MM3 (ref 0–0.2)
BASOPHILS NFR BLD AUTO: 1.2 % (ref 0–1.5)
BILIRUB SERPL-MCNC: 0.4 MG/DL (ref 0–1.2)
BUN SERPL-MCNC: 11 MG/DL (ref 6–20)
BUN/CREAT SERPL: 11.7 (ref 7–25)
CALCIUM SPEC-SCNC: 9.6 MG/DL (ref 8.6–10.5)
CHLORIDE SERPL-SCNC: 101 MMOL/L (ref 98–107)
CHOLEST SERPL-MCNC: 177 MG/DL (ref 0–200)
CO2 SERPL-SCNC: 29.1 MMOL/L (ref 22–29)
CREAT SERPL-MCNC: 0.94 MG/DL (ref 0.76–1.27)
DEPRECATED RDW RBC AUTO: 40.6 FL (ref 37–54)
EOSINOPHIL # BLD AUTO: 0.14 10*3/MM3 (ref 0–0.4)
EOSINOPHIL NFR BLD AUTO: 3.5 % (ref 0.3–6.2)
ERYTHROCYTE [DISTWIDTH] IN BLOOD BY AUTOMATED COUNT: 12.2 % (ref 12.3–15.4)
GFR SERPL CREATININE-BSD FRML MDRD: 89 ML/MIN/1.73
GLOBULIN UR ELPH-MCNC: 3.2 GM/DL
GLUCOSE SERPL-MCNC: 96 MG/DL (ref 65–99)
HCT VFR BLD AUTO: 44.5 % (ref 37.5–51)
HCV AB SER DONR QL: NORMAL
HDLC SERPL-MCNC: 34 MG/DL (ref 40–60)
HGB BLD-MCNC: 14.9 G/DL (ref 13–17.7)
IMM GRANULOCYTES # BLD AUTO: 0.01 10*3/MM3 (ref 0–0.05)
IMM GRANULOCYTES NFR BLD AUTO: 0.2 % (ref 0–0.5)
LDLC SERPL CALC-MCNC: 120 MG/DL (ref 0–100)
LDLC/HDLC SERPL: 3.47 {RATIO}
LIPASE SERPL-CCNC: 32 U/L (ref 13–60)
LYMPHOCYTES # BLD AUTO: 1.28 10*3/MM3 (ref 0.7–3.1)
LYMPHOCYTES NFR BLD AUTO: 31.8 % (ref 19.6–45.3)
MCH RBC QN AUTO: 30.6 PG (ref 26.6–33)
MCHC RBC AUTO-ENTMCNC: 33.5 G/DL (ref 31.5–35.7)
MCV RBC AUTO: 91.4 FL (ref 79–97)
MONOCYTES # BLD AUTO: 0.41 10*3/MM3 (ref 0.1–0.9)
MONOCYTES NFR BLD AUTO: 10.2 % (ref 5–12)
NEUTROPHILS NFR BLD AUTO: 2.13 10*3/MM3 (ref 1.7–7)
NEUTROPHILS NFR BLD AUTO: 53.1 % (ref 42.7–76)
NRBC BLD AUTO-RTO: 0 /100 WBC (ref 0–0.2)
PLATELET # BLD AUTO: 363 10*3/MM3 (ref 140–450)
PMV BLD AUTO: 9.1 FL (ref 6–12)
POTASSIUM SERPL-SCNC: 4.2 MMOL/L (ref 3.5–5.2)
PROT SERPL-MCNC: 7.8 G/DL (ref 6–8.5)
PSA SERPL-MCNC: 0.68 NG/ML (ref 0–4)
RBC # BLD AUTO: 4.87 10*6/MM3 (ref 4.14–5.8)
SODIUM SERPL-SCNC: 139 MMOL/L (ref 136–145)
TRIGL SERPL-MCNC: 125 MG/DL (ref 0–150)
TSH SERPL DL<=0.05 MIU/L-ACNC: 1.41 UIU/ML (ref 0.27–4.2)
VLDLC SERPL-MCNC: 23 MG/DL (ref 5–40)
WBC NRBC COR # BLD: 4.02 10*3/MM3 (ref 3.4–10.8)

## 2022-01-26 PROCEDURE — 99214 OFFICE O/P EST MOD 30 MIN: CPT | Performed by: INTERNAL MEDICINE

## 2022-01-26 PROCEDURE — G0103 PSA SCREENING: HCPCS | Performed by: NURSE PRACTITIONER

## 2022-01-26 PROCEDURE — 80061 LIPID PANEL: CPT

## 2022-01-26 PROCEDURE — 80050 GENERAL HEALTH PANEL: CPT

## 2022-01-26 PROCEDURE — 86677 HELICOBACTER PYLORI ANTIBODY: CPT

## 2022-01-26 PROCEDURE — 93000 ELECTROCARDIOGRAM COMPLETE: CPT | Performed by: INTERNAL MEDICINE

## 2022-01-26 PROCEDURE — 36415 COLL VENOUS BLD VENIPUNCTURE: CPT

## 2022-01-26 PROCEDURE — 83690 ASSAY OF LIPASE: CPT

## 2022-01-26 PROCEDURE — 86803 HEPATITIS C AB TEST: CPT

## 2022-01-26 PROCEDURE — 82150 ASSAY OF AMYLASE: CPT

## 2022-01-26 RX ORDER — SOTALOL HYDROCHLORIDE 80 MG/1
40 TABLET ORAL 2 TIMES DAILY
Qty: 90 TABLET | Refills: 3 | Status: SHIPPED | OUTPATIENT
Start: 2022-01-26 | End: 2023-01-30

## 2022-01-26 NOTE — PROGRESS NOTES
Office Visit    Chief Complaint  Hypertension and Atrial Fibrillation    Subjective            Daniele Bowers presents to [facility]  Daniele is a 41 years old young male with hypertension previous atrial fibrillation who is doing very well. He denies any chest pain palpitation shortness of breath dizziness syncope. He has gained weight. His blood pressures at home are anywhere between 115 and 120 systolic and 70-75 diastolic.      Past Medical History:   Diagnosis Date   • Allergies    • Anxiety    • Atrial fibrillation (HCC)    • GERD (gastroesophageal reflux disease)    • Hypertension    • Paroxysmal atrial fibrillation (HCC) 1/25/2022       No Known Allergies     Past Surgical History:   Procedure Laterality Date   • APPENDECTOMY          Social History     Tobacco Use   • Smoking status: Never Smoker   • Smokeless tobacco: Never Used   Vaping Use   • Vaping Use: Never used   Substance Use Topics   • Alcohol use: Never   • Drug use: Never       Family History   Problem Relation Age of Onset   • Diabetes Mother    • Diabetes Father    • Heart disease Other         Prior to Admission medications    Medication Sig Start Date End Date Taking? Authorizing Provider   aspirin 81 MG chewable tablet Chew 1 tablet Daily.    Provider, Historical, MD   losartan (COZAAR) 50 MG tablet Take 1 tablet by mouth Daily. 1/25/22   Penelope An APRN   omeprazole (priLOSEC) 40 MG capsule Take 1 capsule by mouth Daily. 1/25/22   Penelope An APRN   sotalol (BETAPACE) 80 MG tablet Take 1 tablet by mouth Daily. 1/25/22   Penelope An APRN   venlafaxine XR (EFFEXOR-XR) 37.5 MG 24 hr capsule Take 1 capsule by mouth Daily. 1/25/22   Penelope An APRN   venlafaxine XR (EFFEXOR-XR) 75 MG 24 hr capsule Take 1 capsule by mouth Daily. 1/25/22   Penelope An APRN        Review of Systems   Respiratory: Negative for cough and shortness of breath.    Cardiovascular: Negative for chest pain,  "palpitations and leg swelling.        Objective     /84   Pulse 59   Ht 185.4 cm (73\")   Wt 101 kg (223 lb)   BMI 29.42 kg/m²       Physical Exam  Constitutional:       General: He is awake.      Appearance: Normal appearance.   Neck:      Thyroid: No thyromegaly.      Vascular: No carotid bruit or JVD.   Cardiovascular:      Rate and Rhythm: Normal rate and regular rhythm.      Chest Wall: PMI is not displaced.      Pulses: Normal pulses.      Heart sounds: Normal heart sounds, S1 normal and S2 normal. No murmur heard.  No friction rub. No gallop. No S3 or S4 sounds.    Pulmonary:      Effort: Pulmonary effort is normal.      Breath sounds: Normal breath sounds and air entry. No wheezing, rhonchi or rales.   Abdominal:      General: Bowel sounds are normal.      Palpations: Abdomen is soft. There is no mass.      Tenderness: There is no abdominal tenderness.   Musculoskeletal:      Cervical back: Neck supple.      Right lower leg: No edema.      Left lower leg: No edema.   Neurological:      Mental Status: He is alert and oriented to person, place, and time.   Psychiatric:         Mood and Affect: Mood normal.         Behavior: Behavior is cooperative.           Result Review :                    ECG 12 Lead    Date/Time: 1/26/2022 9:47 AM  Performed by: Bienvenido Rivas MD  Authorized by: Bienvenido Rivas MD   Comments: Sinus rhythm normal axis normal QTC. No change compared to previous EKG           No results found for: PROBNP, BNP          Lab Results   Component Value Date    TSH 1.710 10/28/2020      No results found for: FREET4   No results found for: DDIMERQUANT  No results found for: MG   No results found for: DIGOXIN               Assessment and Plan        Diagnoses and all orders for this visit:    1. Paroxysmal atrial fibrillation (HCC) (Primary)  -     Lipid Panel; Future  -     Comprehensive Metabolic Panel; Future  -     Magnesium; Future  -     ECG 12 Lead    2. Primary " hypertension  Assessment & Plan:  Hypertension is very well controlled.  His home blood pressure readings reveal systolic blood pressure between 115 and 120 systolic and diastolic between 75 and 80.  He will continue his current medications and the current dosage    Orders:  -     Lipid Panel; Future  -     Comprehensive Metabolic Panel; Future  -     Magnesium; Future  -     ECG 12 Lead    3. Atrial fibrillation, unspecified type (HCC)  -     sotalol (BETAPACE) 80 MG tablet; Take 0.5 tablets by mouth 2 (Two) Times a Day.  Dispense: 90 tablet; Refill: 3  -     Lipid Panel; Future  -     Comprehensive Metabolic Panel; Future  -     Magnesium; Future  -     ECG 12 Lead    4. Medication management  -     ECG 12 Lead      He got his blood work done this morning and the results are pending.  We will act on it once we get the results tomorrow    Follow Up     Return in 6 months (on 7/26/2022) for EKG with next office visit, next ov with June.    Patient was given instructions and counseling regarding his condition or for health maintenance advice. Please see specific information pulled into the AVS if appropriate.     Bienvenido Rivas MD  01/26/22 09:06 EST

## 2022-01-26 NOTE — ASSESSMENT & PLAN NOTE
Hypertension is very well controlled.  His home blood pressure readings reveal systolic blood pressure between 115 and 120 systolic and diastolic between 75 and 80.  He will continue his current medications and the current dosage

## 2022-01-26 NOTE — PROGRESS NOTES
Office Visit    Chief Complaint  Hypertension and Atrial Fibrillation    Subjective     {Problem List  Visit Diagnosis   Encounters  Notes  Medications  Labs  Result Review Imaging  Media :23}       Daniele Bowers presents to [facility]  Daniele is a 41 years old young male with hypertension previous atrial fibrillation who is doing very well. He denies any chest pain palpitation shortness of breath dizziness syncope. He has gained weight. His blood pressures at home are anywhere between 115 and 120 systolic and 70-75 diastolic.    Hypertension  Pertinent negatives include no chest pain, palpitations or shortness of breath.   Atrial Fibrillation  Symptoms are negative for chest pain, palpitations and shortness of breath. Past medical history includes atrial fibrillation.       Past Medical History:   Diagnosis Date   • Allergies    • Anxiety    • Atrial fibrillation (HCC)    • GERD (gastroesophageal reflux disease)    • Hypertension    • Paroxysmal atrial fibrillation (HCC) 1/25/2022       No Known Allergies     Past Surgical History:   Procedure Laterality Date   • APPENDECTOMY          Social History     Tobacco Use   • Smoking status: Never Smoker   • Smokeless tobacco: Never Used   Vaping Use   • Vaping Use: Never used   Substance Use Topics   • Alcohol use: Never   • Drug use: Never       Family History   Problem Relation Age of Onset   • Diabetes Mother    • Diabetes Father    • Heart disease Other         Prior to Admission medications    Medication Sig Start Date End Date Taking? Authorizing Provider   aspirin 81 MG chewable tablet Chew 1 tablet Daily.    Provider, MD Niall   losartan (COZAAR) 50 MG tablet Take 1 tablet by mouth Daily. 1/25/22   Penelope An APRN   omeprazole (priLOSEC) 40 MG capsule Take 1 capsule by mouth Daily. 1/25/22   Penelope An APRN   sotalol (BETAPACE) 80 MG tablet Take 1 tablet by mouth Daily. 1/25/22   Penelope An APRN  "  venlafaxine XR (EFFEXOR-XR) 37.5 MG 24 hr capsule Take 1 capsule by mouth Daily. 1/25/22   Penelope An APRN   venlafaxine XR (EFFEXOR-XR) 75 MG 24 hr capsule Take 1 capsule by mouth Daily. 1/25/22   Penelope An APRN        Review of Systems   Respiratory: Negative for cough and shortness of breath.    Cardiovascular: Negative for chest pain, palpitations and leg swelling.        Objective     /84   Pulse 59   Ht 185.4 cm (73\")   Wt 101 kg (223 lb)   BMI 29.42 kg/m²       Physical Exam  Constitutional:       General: He is awake.      Appearance: Normal appearance.   Neck:      Thyroid: No thyromegaly.      Vascular: No carotid bruit or JVD.   Cardiovascular:      Rate and Rhythm: Normal rate and regular rhythm.      Chest Wall: PMI is not displaced.      Pulses: Normal pulses.      Heart sounds: Normal heart sounds, S1 normal and S2 normal. No murmur heard.  No friction rub. No gallop. No S3 or S4 sounds.    Pulmonary:      Effort: Pulmonary effort is normal.      Breath sounds: Normal breath sounds and air entry. No wheezing, rhonchi or rales.   Abdominal:      General: Bowel sounds are normal.      Palpations: Abdomen is soft. There is no mass.      Tenderness: There is no abdominal tenderness.   Musculoskeletal:      Cervical back: Neck supple.      Right lower leg: No edema.      Left lower leg: No edema.   Neurological:      Mental Status: He is alert and oriented to person, place, and time.   Psychiatric:         Mood and Affect: Mood normal.         Behavior: Behavior is cooperative.           Result Review :{Labs  Result Review  Imaging  Med Tab  Media :23}     {The following data was reviewed by (Optional):[id number]}    {Ambulatory Labs (Optional):81391}    {Data reviewed (Optional):78094:::1}              Assessment and Plan {CC Problem List  Visit Diagnosis  ROS  Review (Popup)  Health Maintenance  Quality  BestPractice  Medications  SmartSets  SnapShot Encounters  " Media :23}       Diagnoses and all orders for this visit:    1. Medication management (Primary)  -     ECG 12 Lead    2. Paroxysmal atrial fibrillation (HCC)  -     Lipid Panel; Future  -     Comprehensive Metabolic Panel; Future  -     Magnesium; Future  -     ECG 12 Lead    3. Primary hypertension  -     Lipid Panel; Future  -     Comprehensive Metabolic Panel; Future  -     Magnesium; Future  -     ECG 12 Lead    4. Atrial fibrillation, unspecified type (HCC)  -     sotalol (BETAPACE) 80 MG tablet; Take 0.5 tablets by mouth 2 (Two) Times a Day.  Dispense: 90 tablet; Refill: 3  -     Lipid Panel; Future  -     Comprehensive Metabolic Panel; Future  -     Magnesium; Future  -     ECG 12 Lead            Follow Up {Instructions Charge Capture  Follow-up Communications :23}    Return in 6 months (on 7/26/2022) for EKG with next office visit, next ov with June.    Patient was given instructions and counseling regarding his condition or for health maintenance advice. Please see specific information pulled into the AVS if appropriate.     Bienvenido Rivas MD  01/26/22 09:06 EST

## 2022-01-28 LAB
H PYLORI IGA SER-ACNC: <9 UNITS (ref 0–8.9)
H PYLORI IGG SER IA-ACNC: 0.8 INDEX VALUE (ref 0–0.79)
H PYLORI IGM SER-ACNC: <9 UNITS (ref 0–8.9)

## 2022-02-01 ENCOUNTER — TELEPHONE (OUTPATIENT)
Dept: FAMILY MEDICINE CLINIC | Facility: CLINIC | Age: 42
End: 2022-02-01

## 2022-02-01 NOTE — TELEPHONE ENCOUNTER
----- Message from ADALBERTO Salazar sent at 1/31/2022 10:21 AM EST -----  LDL slightly elevated, tighter diet control.  H. pylori showing previous infection nothing acute.  Otherwise normal labs, can refer to GI for patient's symptoms.

## 2022-02-01 NOTE — TELEPHONE ENCOUNTER
Patient notified, states understanding.  Patient declines GI referral at this time, he states he has has some improvement in symptoms since his appt with diet modifications.

## 2022-03-02 PROBLEM — K21.9 GERD (GASTROESOPHAGEAL REFLUX DISEASE): Status: ACTIVE | Noted: 2022-03-02

## 2022-03-02 PROBLEM — F41.9 ANXIETY: Status: ACTIVE | Noted: 2022-03-02

## 2022-03-03 ENCOUNTER — OFFICE VISIT (OUTPATIENT)
Dept: FAMILY MEDICINE CLINIC | Facility: CLINIC | Age: 42
End: 2022-03-03

## 2022-03-03 ENCOUNTER — HOSPITAL ENCOUNTER (OUTPATIENT)
Dept: GENERAL RADIOLOGY | Facility: HOSPITAL | Age: 42
Discharge: HOME | End: 2022-03-03
Admitting: NURSE PRACTITIONER

## 2022-03-03 VITALS
HEIGHT: 73 IN | SYSTOLIC BLOOD PRESSURE: 117 MMHG | DIASTOLIC BLOOD PRESSURE: 79 MMHG | OXYGEN SATURATION: 98 % | WEIGHT: 220 LBS | BODY MASS INDEX: 29.16 KG/M2 | HEART RATE: 57 BPM

## 2022-03-03 DIAGNOSIS — M25.511 ACUTE PAIN OF RIGHT SHOULDER: ICD-10-CM

## 2022-03-03 DIAGNOSIS — M25.511 ACUTE PAIN OF RIGHT SHOULDER: Primary | ICD-10-CM

## 2022-03-03 PROCEDURE — 73030 X-RAY EXAM OF SHOULDER: CPT

## 2022-03-03 PROCEDURE — 99213 OFFICE O/P EST LOW 20 MIN: CPT | Performed by: NURSE PRACTITIONER

## 2022-03-03 NOTE — PROGRESS NOTES
"Chief Complaint  Shoulder Pain    Subjective            Daniele Bowers presents to [facility]  Pt has c/o (R) shoulder pain. Pt states the pain started approximately 3 wks ago. NKI. Pt lifts weight and plays basketball. Pt states nothing unusual has happened. Pt has tried heat, ice, and a massage gun. This helps some but the pain returns. Pt has not had any imaging.         Past Medical History:   Diagnosis Date   • Allergies    • Anxiety    • Atrial fibrillation (HCC)    • GERD (gastroesophageal reflux disease)    • Hypertension    • Paroxysmal atrial fibrillation (HCC) 1/25/2022       No Known Allergies     Past Surgical History:   Procedure Laterality Date   • APPENDECTOMY          Social History     Tobacco Use   • Smoking status: Never Smoker   • Smokeless tobacco: Never Used   Substance Use Topics   • Alcohol use: Never       Family History   Problem Relation Age of Onset   • Diabetes Mother    • Diabetes Father    • Heart disease Other         Current Outpatient Medications on File Prior to Visit   Medication Sig   • aspirin 81 MG chewable tablet Chew 1 tablet Daily.   • losartan (COZAAR) 50 MG tablet Take 1 tablet by mouth Daily.   • omeprazole (priLOSEC) 40 MG capsule Take 1 capsule by mouth Daily.   • sotalol (BETAPACE) 80 MG tablet Take 0.5 tablets by mouth 2 (Two) Times a Day.   • venlafaxine XR (EFFEXOR-XR) 37.5 MG 24 hr capsule Take 1 capsule by mouth Daily.   • venlafaxine XR (EFFEXOR-XR) 75 MG 24 hr capsule Take 1 capsule by mouth Daily.     No current facility-administered medications on file prior to visit.       Health Maintenance Due   Topic Date Due   • ANNUAL PHYSICAL  Never done   • COVID-19 Vaccine (2 - Booster for Godwin series) 06/07/2021       Objective     /79   Pulse 57   Ht 185.4 cm (73\")   Wt 99.8 kg (220 lb)   SpO2 98%   BMI 29.03 kg/m²       Physical Exam  Constitutional:       General: He is not in acute distress.     Appearance: " Normal appearance. He is not ill-appearing.   HENT:      Head: Normocephalic and atraumatic.      Mouth/Throat:      Pharynx: No oropharyngeal exudate or posterior oropharyngeal erythema.   Cardiovascular:      Rate and Rhythm: Normal rate and regular rhythm.      Heart sounds: Normal heart sounds. No murmur heard.      Pulmonary:      Effort: Pulmonary effort is normal. No respiratory distress.      Breath sounds: Normal breath sounds.   Chest:      Chest wall: No tenderness.   Abdominal:      General: There is no distension.      Palpations: There is no mass.      Tenderness: There is no abdominal tenderness. There is no guarding.   Musculoskeletal:         General: No swelling.      Right shoulder: Tenderness and bony tenderness present. No swelling, deformity or crepitus. Decreased range of motion. Normal strength.      Cervical back: Normal range of motion and neck supple.   Skin:     General: Skin is warm and dry.      Findings: No rash.   Neurological:      General: No focal deficit present.      Mental Status: He is alert and oriented to person, place, and time. Mental status is at baseline.      Gait: Gait normal.   Psychiatric:         Mood and Affect: Mood normal.         Behavior: Behavior normal.         Thought Content: Thought content normal.         Judgment: Judgment normal.           Result Review :                           Assessment and Plan        Diagnoses and all orders for this visit:    1. Acute pain of right shoulder (Primary)  Comments:  will proceed with MRI if x-ray is negative  Orders:  -     XR Shoulder 2+ View Right; Future              Follow Up     Return if symptoms worsen or fail to improve.    Patient was given instructions and counseling regarding his condition or for health maintenance advice. Please see specific information pulled into the AVS if appropriate.     Daniele Bowers  reports that he has never smoked. He has never used smokeless tobacco..

## 2022-03-07 ENCOUNTER — TELEPHONE (OUTPATIENT)
Dept: FAMILY MEDICINE CLINIC | Facility: CLINIC | Age: 42
End: 2022-03-07

## 2022-03-07 NOTE — TELEPHONE ENCOUNTER
----- Message from ADALBERTO Christine sent at 3/4/2022  8:27 AM EST -----  Developmental glenoid hypoplasia otherwise wnl, recommend MRI to further evaluate

## 2022-03-07 NOTE — TELEPHONE ENCOUNTER
Pt states has been doing different workout that seem to be helping. Pt wants to hold off the MRI for now and will call back for it if needed.

## 2022-07-20 NOTE — PROGRESS NOTES
Chief Complaint  Atrial Fibrillation and Hypertension    Subjective        Daniele Bowers presents to [facility] CARDIOLOGY  Is a 42-year-old male comes in to evaluate his atrial arrhythmias and hypertension. Denies any chest pains, shortness of breath, palpitations, dizziness, syncope, swelling, PND, or orthopnea.  Cardiac wise he has no complaints.  States he does not follow very good low-fat cholesterol diet.  Tends to eat a lot of meats.  Is lost a pound since his last visit.     Past History:    Past Medical History:   Diagnosis Date   • Allergies    • Anxiety    • Atrial fibrillation (HCC)    • GERD (gastroesophageal reflux disease)    • Hypertension    • Paroxysmal atrial fibrillation (HCC) 1/25/2022        Family History: family history includes Diabetes in his father and mother; Heart disease in an other family member.     Social History: reports that he has never smoked. He has never used smokeless tobacco. He reports that he does not drink alcohol and does not use drugs.    Allergies: Patient has no known allergies.    Past Surgical History:   Procedure Laterality Date   • APPENDECTOMY            Current Outpatient Medications:   •  aspirin 81 MG chewable tablet, Chew 1 tablet Daily., Disp: , Rfl:   •  levocetirizine (XYZAL) 5 MG tablet, Take 5 mg by mouth Every Evening., Disp: , Rfl:   •  losartan (COZAAR) 50 MG tablet, Take 1 tablet by mouth Daily., Disp: 90 tablet, Rfl: 1  •  omeprazole (priLOSEC) 40 MG capsule, Take 1 capsule by mouth Daily., Disp: 90 capsule, Rfl: 1  •  sotalol (BETAPACE) 80 MG tablet, Take 0.5 tablets by mouth 2 (Two) Times a Day., Disp: 90 tablet, Rfl: 3  •  venlafaxine XR (EFFEXOR-XR) 37.5 MG 24 hr capsule, Take 1 capsule by mouth Daily., Disp: 90 capsule, Rfl: 1  •  venlafaxine XR (EFFEXOR-XR) 75 MG 24 hr capsule, Take 1 capsule by mouth Daily., Disp: 90 capsule, Rfl: 1     There are no discontinued medications.     Review of Systems   Constitutional: Negative  "for fatigue.   Respiratory: Negative for cough and shortness of breath.    Cardiovascular: Negative for chest pain, palpitations and leg swelling.   Neurological: Negative for dizziness.   All other systems reviewed and are negative.       Objective     Physical Exam  Constitutional:       General: He is not in acute distress.     Appearance: Normal appearance.   Neck:      Vascular: No carotid bruit.   Cardiovascular:      Rate and Rhythm: Normal rate and regular rhythm.      Heart sounds: Normal heart sounds.   Pulmonary:      Effort: Pulmonary effort is normal.      Breath sounds: Normal breath sounds.   Musculoskeletal:      Right lower leg: No edema.      Left lower leg: No edema.   Neurological:      Mental Status: He is alert.       /87   Pulse 61   Ht 185.4 cm (73\")   Wt 97.5 kg (215 lb)   BMI 28.37 kg/m²       Vitals:    07/26/22 0910   BP: 124/87   Pulse: 61       Result Review :         The following data was reviewed by: ADALBERTO Ramirez on 07/26/2022:        CMP    CMP 1/26/22 7/21/22   Glucose 96 98   BUN 11 11   Creatinine 0.94 0.95   eGFR Non African Am 89    Sodium 139 141   Potassium 4.2 4.2   Chloride 101 104   Calcium 9.6 9.4   Albumin 4.60 4.50   Total Bilirubin 0.4 0.3   Alkaline Phosphatase 100 72   AST (SGOT) 21 24   ALT (SGPT) 19 18      Comments are available for some flowsheets but are not being displayed.           CBC w/diff    CBC w/Diff 1/26/22   WBC 4.02   RBC 4.87   Hemoglobin 14.9   Hematocrit 44.5   MCV 91.4   MCH 30.6   MCHC 33.5   RDW 12.2 (A)   Platelets 363   Neutrophil Rel % 53.1   Immature Granulocyte Rel % 0.2   Lymphocyte Rel % 31.8   Monocyte Rel % 10.2   Eosinophil Rel % 3.5   Basophil Rel % 1.2   (A) Abnormal value             Lipid Panel    Lipid Panel 1/26/22 7/21/22   Total Cholesterol 177 191   Triglycerides 125 111   HDL Cholesterol 34 (A) 35 (A)   VLDL Cholesterol 23 20   LDL Cholesterol  120 (A) 136 (A)   LDL/HDL Ratio 3.47 3.82   (A) Abnormal " value             Lab Results   Component Value Date    TSH 1.410 01/26/2022    TSH 1.710 10/28/2020             ECG 12 Lead    Date/Time: 7/26/2022 9:56 AM  Performed by: Savannah Hester APRN  Authorized by: Savannah Hester APRN   Comparison: compared with previous ECG from 1/26/2022  Similar to previous ECG  Rhythm: sinus rhythm  Rate: normal  BPM: 58    Clinical impression: normal ECG            Assessment and Plan    Diagnoses and all orders for this visit:    1. Primary hypertension (Primary)  Assessment & Plan:  Controlled.  Continue sotalol 80 half twice daily and losartan 50 mg.      2. Paroxysmal atrial fibrillation (HCC)  Assessment & Plan:  Currently in sinus rhythm.  No recent episodes.  Continue sotalol 80 mg half twice daily and aspirin 81 mg.      3. Hyperlipidemia, unspecified hyperlipidemia type  Assessment & Plan:  Counseled regarding worsening cholesterol.  Counseled on low-fat cholesterol diet.  He does not want to be on a statin drug at this time.      Other orders  -     ECG 12 Lead          Follow Up     Return in about 6 months (around 1/26/2023) for with Dr Leon, EKG on next visit.    Patient was given instructions and counseling regarding his condition or for health maintenance advice. Please see specific information pulled into the AVS if appropriate.       ADALBERTO Mata  07/26/22 11:27 EDT

## 2022-07-21 ENCOUNTER — LAB (OUTPATIENT)
Dept: LAB | Facility: HOSPITAL | Age: 42
End: 2022-07-21

## 2022-07-21 DIAGNOSIS — I48.0 PAROXYSMAL ATRIAL FIBRILLATION: ICD-10-CM

## 2022-07-21 DIAGNOSIS — I48.91 ATRIAL FIBRILLATION, UNSPECIFIED TYPE: ICD-10-CM

## 2022-07-21 DIAGNOSIS — I10 PRIMARY HYPERTENSION: ICD-10-CM

## 2022-07-21 LAB
ALBUMIN SERPL-MCNC: 4.5 G/DL (ref 3.5–5.2)
ALBUMIN/GLOB SERPL: 1.5 G/DL
ALP SERPL-CCNC: 72 U/L (ref 39–117)
ALT SERPL W P-5'-P-CCNC: 18 U/L (ref 1–41)
ANION GAP SERPL CALCULATED.3IONS-SCNC: 10.9 MMOL/L (ref 5–15)
AST SERPL-CCNC: 24 U/L (ref 1–40)
BILIRUB SERPL-MCNC: 0.3 MG/DL (ref 0–1.2)
BUN SERPL-MCNC: 11 MG/DL (ref 6–20)
BUN/CREAT SERPL: 11.6 (ref 7–25)
CALCIUM SPEC-SCNC: 9.4 MG/DL (ref 8.6–10.5)
CHLORIDE SERPL-SCNC: 104 MMOL/L (ref 98–107)
CHOLEST SERPL-MCNC: 191 MG/DL (ref 0–200)
CO2 SERPL-SCNC: 26.1 MMOL/L (ref 22–29)
CREAT SERPL-MCNC: 0.95 MG/DL (ref 0.76–1.27)
EGFRCR SERPLBLD CKD-EPI 2021: 103.1 ML/MIN/1.73
GLOBULIN UR ELPH-MCNC: 3.1 GM/DL
GLUCOSE SERPL-MCNC: 98 MG/DL (ref 65–99)
HDLC SERPL-MCNC: 35 MG/DL (ref 40–60)
LDLC SERPL CALC-MCNC: 136 MG/DL (ref 0–100)
LDLC/HDLC SERPL: 3.82 {RATIO}
MAGNESIUM SERPL-MCNC: 2.1 MG/DL (ref 1.6–2.6)
POTASSIUM SERPL-SCNC: 4.2 MMOL/L (ref 3.5–5.2)
PROT SERPL-MCNC: 7.6 G/DL (ref 6–8.5)
SODIUM SERPL-SCNC: 141 MMOL/L (ref 136–145)
TRIGL SERPL-MCNC: 111 MG/DL (ref 0–150)
VLDLC SERPL-MCNC: 20 MG/DL (ref 5–40)

## 2022-07-21 PROCEDURE — 80053 COMPREHEN METABOLIC PANEL: CPT

## 2022-07-21 PROCEDURE — 83735 ASSAY OF MAGNESIUM: CPT

## 2022-07-21 PROCEDURE — 36415 COLL VENOUS BLD VENIPUNCTURE: CPT

## 2022-07-21 PROCEDURE — 80061 LIPID PANEL: CPT

## 2022-07-26 ENCOUNTER — OFFICE VISIT (OUTPATIENT)
Dept: CARDIOLOGY | Facility: CLINIC | Age: 42
End: 2022-07-26

## 2022-07-26 VITALS
WEIGHT: 215 LBS | BODY MASS INDEX: 28.49 KG/M2 | SYSTOLIC BLOOD PRESSURE: 124 MMHG | DIASTOLIC BLOOD PRESSURE: 87 MMHG | HEART RATE: 61 BPM | HEIGHT: 73 IN

## 2022-07-26 DIAGNOSIS — E78.5 HYPERLIPIDEMIA, UNSPECIFIED HYPERLIPIDEMIA TYPE: ICD-10-CM

## 2022-07-26 DIAGNOSIS — I48.0 PAROXYSMAL ATRIAL FIBRILLATION: Chronic | ICD-10-CM

## 2022-07-26 DIAGNOSIS — I10 PRIMARY HYPERTENSION: Primary | Chronic | ICD-10-CM

## 2022-07-26 PROCEDURE — 93000 ELECTROCARDIOGRAM COMPLETE: CPT | Performed by: NURSE PRACTITIONER

## 2022-07-26 PROCEDURE — 99214 OFFICE O/P EST MOD 30 MIN: CPT | Performed by: NURSE PRACTITIONER

## 2022-07-26 RX ORDER — LEVOCETIRIZINE DIHYDROCHLORIDE 5 MG/1
5 TABLET, FILM COATED ORAL EVERY EVENING
COMMUNITY

## 2022-07-26 NOTE — ASSESSMENT & PLAN NOTE
Counseled regarding worsening cholesterol.  Counseled on low-fat cholesterol diet.  He does not want to be on a statin drug at this time.

## 2022-07-26 NOTE — ASSESSMENT & PLAN NOTE
Currently in sinus rhythm.  No recent episodes.  Continue sotalol 80 mg half twice daily and aspirin 81 mg.

## 2022-07-26 NOTE — PATIENT INSTRUCTIONS
Cholesterol Content in Foods  Cholesterol is a waxy, fat-like substance that helps to carry fat in the blood. The body needs cholesterol in small amounts, but too much cholesterol can cause damage to the arteries and heart.  Most people should eat less than 200 milligrams (mg) of cholesterol a day.  Foods with cholesterol    Cholesterol is found in animal-based foods, such as meat, seafood, and dairy. Generally, low-fat dairy and lean meats have less cholesterol than full-fat dairy and fatty meats. The milligrams of cholesterol per serving (mg per serving) of common cholesterol-containing foods are listed below.  Meat and other proteins  Egg -- one large whole egg has 186 mg.  Veal shank -- 4 oz has 141 mg.  Lean ground turkey (93% lean) -- 4 oz has 118 mg.  Fat-trimmed lamb loin -- 4 oz has 106 mg.  Lean ground beef (90% lean) -- 4 oz has 100 mg.  Lobster -- 3.5 oz has 90 mg.  Pork loin chops -- 4 oz has 86 mg.  Canned salmon -- 3.5 oz has 83 mg.  Fat-trimmed beef top loin -- 4 oz has 78 mg.  Frankfurter -- 1 matthew (3.5 oz) has 77 mg.  Crab -- 3.5 oz has 71 mg.  Roasted chicken without skin, white meat -- 4 oz has 66 mg.  Light bologna -- 2 oz has 45 mg.  Deli-cut turkey -- 2 oz has 31 mg.  Canned tuna -- 3.5 oz has 31 mg.  Smart -- 1 oz has 29 mg.  Oysters and mussels (raw) -- 3.5 oz has 25 mg.  Mackerel -- 1 oz has 22 mg.  Trout -- 1 oz has 20 mg.  Pork sausage -- 1 link (1 oz) has 17 mg.  [employer] -- 1 oz has 16 mg.  Tilapia -- 1 oz has 14 mg.  Dairy  Soft-serve ice cream -- ½ cup (4 oz) has 103 mg.  Whole-milk yogurt -- 1 cup (8 oz) has 29 mg.  Cheddar cheese -- 1 oz has 28 mg.  American cheese -- 1 oz has 28 mg.  Whole milk -- 1 cup (8 oz) has 23 mg.  2% milk -- 1 cup (8 oz) has 18 mg.  Cream cheese -- 1 tablespoon (Tbsp) has 15 mg.  Cottage cheese -- ½ cup (4 oz) has 14 mg.  Low-fat (1%) milk -- 1 cup (8 oz) has 10 mg.  Sour cream -- 1 Tbsp has 8.5 mg.  Low-fat yogurt -- 1 cup (8 oz) has 8 mg.  Nonfat Greek  yogurt -- 1 cup (8 oz) has 7 mg.  Half-and-half cream -- 1 Tbsp has 5 mg.  Fats and oils  Cod liver oil -- 1 tablespoon (Tbsp) has 82 mg.  Butter -- 1 Tbsp has 15 mg.  Lard -- 1 Tbsp has 14 mg.  Smart grease -- 1 Tbsp has 14 mg.  Mayonnaise -- 1 Tbsp has 5-10 mg.  Margarine -- 1 Tbsp has 3-10 mg.  Exact amounts of cholesterol in these foods may vary depending on specific ingredients and brands.  Foods without cholesterol  Most plant-based foods do not have cholesterol unless you combine them with a food that has cholesterol. Foods without cholesterol include:  Grains and cereals.  Vegetables.  Fruits.  Vegetable oils, such as olive, canola, and sunflower oil.  Legumes, such as peas, beans, and lentils.  Nuts and seeds.  Egg whites.  Summary  The body needs cholesterol in small amounts, but too much cholesterol can cause damage to the arteries and heart.  Most people should eat less than 200 milligrams (mg) of cholesterol a day.  This information is not intended to replace advice given to you by your health care provider. Make sure you discuss any questions you have with your health care provider.  Document Revised: 05/10/2021 Document Reviewed: 05/10/2021  [employer] Patient Education © 2021 [employer]

## 2022-08-04 DIAGNOSIS — I10 ESSENTIAL HYPERTENSION: ICD-10-CM

## 2022-08-04 DIAGNOSIS — K21.9 GASTROESOPHAGEAL REFLUX DISEASE WITHOUT ESOPHAGITIS: ICD-10-CM

## 2022-08-04 DIAGNOSIS — F41.9 ANXIETY: ICD-10-CM

## 2022-08-04 NOTE — TELEPHONE ENCOUNTER
Caller: Daniele Bowers    Relationship: Self    Best call back number: [phone]        Requested Prescriptions:   Requested Prescriptions     Pending Prescriptions Disp Refills   • venlafaxine XR (EFFEXOR-XR) 37.5 MG 24 hr capsule 90 capsule 1     Sig: Take 1 capsule by mouth Daily.   • losartan (COZAAR) 50 MG tablet 90 tablet 1     Sig: Take 1 tablet by mouth Daily.   • omeprazole (priLOSEC) 40 MG capsule 90 capsule 1     Sig: Take 1 capsule by mouth Daily.   • venlafaxine XR (EFFEXOR-XR) 75 MG 24 hr capsule 90 capsule 1     Sig: Take 1 capsule by mouth Daily.        Pharmacy where request should be sent: [id number] - [place], [place] - [address] AT [facility] [phone] [facility] [phone] FX     Additional details provided by patient:       Does the patient have less than a 3 day supply:  [] Yes  [x] No    Elliott Elizabeth Rep   08/04/22 15:08 EDT

## 2022-08-08 RX ORDER — VENLAFAXINE HYDROCHLORIDE 37.5 MG/1
37.5 CAPSULE, EXTENDED RELEASE ORAL DAILY
Qty: 90 CAPSULE | Refills: 1 | Status: SHIPPED | OUTPATIENT
Start: 2022-08-08 | End: 2022-09-14 | Stop reason: SDUPTHER

## 2022-08-08 RX ORDER — VENLAFAXINE HYDROCHLORIDE 37.5 MG/1
37.5 CAPSULE, EXTENDED RELEASE ORAL DAILY
Qty: 90 CAPSULE | Refills: 0 | Status: SHIPPED | OUTPATIENT
Start: 2022-08-08 | End: 2022-09-14 | Stop reason: SDUPTHER

## 2022-08-08 RX ORDER — OMEPRAZOLE 40 MG/1
40 CAPSULE, DELAYED RELEASE ORAL DAILY
Qty: 90 CAPSULE | Refills: 0 | Status: SHIPPED | OUTPATIENT
Start: 2022-08-08 | End: 2022-09-14 | Stop reason: SDUPTHER

## 2022-08-08 RX ORDER — OMEPRAZOLE 40 MG/1
40 CAPSULE, DELAYED RELEASE ORAL DAILY
Qty: 90 CAPSULE | Refills: 1 | Status: SHIPPED | OUTPATIENT
Start: 2022-08-08 | End: 2022-09-14 | Stop reason: SDUPTHER

## 2022-08-08 RX ORDER — VENLAFAXINE HYDROCHLORIDE 75 MG/1
75 CAPSULE, EXTENDED RELEASE ORAL DAILY
Qty: 90 CAPSULE | Refills: 1 | Status: SHIPPED | OUTPATIENT
Start: 2022-08-08 | End: 2022-09-14 | Stop reason: SDUPTHER

## 2022-08-08 RX ORDER — LOSARTAN POTASSIUM 50 MG/1
50 TABLET ORAL DAILY
Qty: 90 TABLET | Refills: 0 | Status: SHIPPED | OUTPATIENT
Start: 2022-08-08 | End: 2022-09-14 | Stop reason: SDUPTHER

## 2022-08-08 RX ORDER — LOSARTAN POTASSIUM 50 MG/1
50 TABLET ORAL DAILY
Qty: 90 TABLET | Refills: 1 | Status: SHIPPED | OUTPATIENT
Start: 2022-08-08

## 2022-09-14 ENCOUNTER — OFFICE VISIT (OUTPATIENT)
Dept: FAMILY MEDICINE CLINIC | Facility: CLINIC | Age: 42
End: 2022-09-14

## 2022-09-14 VITALS
HEIGHT: 73 IN | HEART RATE: 62 BPM | BODY MASS INDEX: 29.08 KG/M2 | DIASTOLIC BLOOD PRESSURE: 80 MMHG | OXYGEN SATURATION: 100 % | SYSTOLIC BLOOD PRESSURE: 125 MMHG | WEIGHT: 219.4 LBS | TEMPERATURE: 97.6 F

## 2022-09-14 DIAGNOSIS — I48.91 ATRIAL FIBRILLATION, UNSPECIFIED TYPE: ICD-10-CM

## 2022-09-14 DIAGNOSIS — F41.9 ANXIETY: ICD-10-CM

## 2022-09-14 DIAGNOSIS — D17.0 LIPOMA OF NECK: ICD-10-CM

## 2022-09-14 DIAGNOSIS — I10 ESSENTIAL HYPERTENSION: Primary | ICD-10-CM

## 2022-09-14 DIAGNOSIS — K21.9 GASTROESOPHAGEAL REFLUX DISEASE WITHOUT ESOPHAGITIS: ICD-10-CM

## 2022-09-14 PROCEDURE — 99214 OFFICE O/P EST MOD 30 MIN: CPT | Performed by: NURSE PRACTITIONER

## 2022-09-14 RX ORDER — VENLAFAXINE HYDROCHLORIDE 75 MG/1
75 CAPSULE, EXTENDED RELEASE ORAL DAILY
Qty: 90 CAPSULE | Refills: 1 | Status: SHIPPED | OUTPATIENT
Start: 2022-09-14 | End: 2023-02-27

## 2022-09-14 RX ORDER — VENLAFAXINE HYDROCHLORIDE 37.5 MG/1
37.5 CAPSULE, EXTENDED RELEASE ORAL DAILY
Qty: 90 CAPSULE | Refills: 1 | Status: SHIPPED | OUTPATIENT
Start: 2022-09-14

## 2022-09-14 RX ORDER — OMEPRAZOLE 40 MG/1
40 CAPSULE, DELAYED RELEASE ORAL DAILY
Qty: 90 CAPSULE | Refills: 1 | Status: SHIPPED | OUTPATIENT
Start: 2022-09-14

## 2022-09-14 NOTE — PROGRESS NOTES
Chief Complaint  Follow-up (6 months), Hypertension, Atrial Fibrillation, Anxiety, and Heartburn    SUBJECTIVE  Daniele Bowers presents to [facility] FAM[facility]     Hypertension/A-fib:  Patient is taking Losartan, ASA, Sotalol.  Patient's Blood Pressure in clinic today is 125/80.  Patient monitors blood pressure at home with readings of 120s/80s.  Patient denies chest pain, shortness of air, headache, flushing, abnormal swelling in feet/ankles.  Patient's cardiologist is Dr. Rivas.    Anxiety:  Patient is taking Venlafaxine with good control of symptoms.    Gastroesophageal Reflux:  Patient is taking Omeprazole, with good control of symptoms.  Patient does not need over the counter medications for breakthrough symptoms.  Patient tries to avoid trigger foods, eat frequent small meals, not lie down within 2 hours of eating, avoids NSAIDS medications and alcohol.    Patient is concerned about a fat pad located at the bottom of his cervical spine.  He states it has been there for a couple years.  He thought it was just a nodule from staring at the computer all day he thinks it has slightly increased in size.  It is not tender or bothersome.      History of Present Illness  Past Medical History:   Diagnosis Date   • Allergies    • Anxiety    • Atrial fibrillation (HCC)    • GERD (gastroesophageal reflux disease)    • Hypertension    • Paroxysmal atrial fibrillation (HCC) 1/25/2022      Family History   Problem Relation Age of Onset   • Diabetes Mother    • Diabetes Father    • Heart disease Other       Past Surgical History:   Procedure Laterality Date   • APPENDECTOMY          Current Outpatient Medications:   •  aspirin 81 MG chewable tablet, Chew 1 tablet Daily., Disp: , Rfl:   •  levocetirizine (XYZAL) 5 MG tablet, Take 5 mg by mouth Every Evening., Disp: , Rfl:   •  losartan (COZAAR) 50 MG tablet, Take 1 tablet by mouth Daily., Disp: 90 tablet, Rfl: 1  •  omeprazole (priLOSEC) 40 MG capsule,  "Take 1 capsule by mouth Daily., Disp: 90 capsule, Rfl: 1  •  sotalol (BETAPACE) 80 MG tablet, Take 0.5 tablets by mouth 2 (Two) Times a Day., Disp: 90 tablet, Rfl: 3  •  venlafaxine XR (EFFEXOR-XR) 37.5 MG 24 hr capsule, Take 1 capsule by mouth Daily., Disp: 90 capsule, Rfl: 1  •  venlafaxine XR (EFFEXOR-XR) 75 MG 24 hr capsule, Take 1 capsule by mouth Daily., Disp: 90 capsule, Rfl: 1    OBJECTIVE  Vital Signs:   /80 (BP Location: Left arm, Patient Position: Sitting, Cuff Size: Adult)   Pulse 62   Temp 97.6 °F (36.4 °C) (Oral)   Ht 185.4 cm (73\")   Wt 99.5 kg (219 lb 6.4 oz)   SpO2 100%   BMI 28.95 kg/m²    Estimated body mass index is 28.95 kg/m² as calculated from the following:    Height as of this encounter: 185.4 cm (73\").    Weight as of this encounter: 99.5 kg (219 lb 6.4 oz).     Wt Readings from Last 3 Encounters:   09/14/22 99.5 kg (219 lb 6.4 oz)   07/26/22 97.5 kg (215 lb)   03/03/22 99.8 kg (220 lb)     BP Readings from Last 3 Encounters:   09/14/22 125/80   07/26/22 124/87   03/03/22 117/79       Physical Exam  Vitals reviewed.   Constitutional:       Appearance: Normal appearance. He is well-developed.   HENT:      Head: Normocephalic and atraumatic.      Right Ear: External ear normal.      Left Ear: External ear normal.      Mouth/Throat:      Pharynx: No oropharyngeal exudate.   Eyes:      Conjunctiva/sclera: Conjunctivae normal.      Pupils: Pupils are equal, round, and reactive to light.   Cardiovascular:      Rate and Rhythm: Normal rate and regular rhythm.      Heart sounds: No murmur heard.    No friction rub. No gallop.   Pulmonary:      Effort: Pulmonary effort is normal.      Breath sounds: Normal breath sounds. No wheezing or rhonchi.   Skin:     General: Skin is warm and dry.          Neurological:      Mental Status: He is alert and oriented to person, place, and time.      Cranial Nerves: No cranial nerve deficit.   Psychiatric:         Mood and Affect: Mood and affect " normal.         Behavior: Behavior normal.         Thought Content: Thought content normal.         Judgment: Judgment normal.          Result Review        No Images in the past 120 days found..      The above data has been reviewed by ADALBERTO Salazar 09/14/2022 10:49 EDT.          Patient Care Team:  Penelope An APRN as PCP - General (Family Medicine)  Tony Leon MD as Consulting Physician (Cardiology)        ASSESSMENT & PLAN    Diagnoses and all orders for this visit:    1. Essential hypertension (Primary)  Comments:  Symptoms well controlled with current medication regimen, cont. Current meds.    2. Atrial fibrillation, unspecified type (HCC)  Comments:  Stable, continue follow-up with cardiology    3. Gastroesophageal reflux disease without esophagitis  Comments:  well controlled, cont current medications  Orders:  -     omeprazole (priLOSEC) 40 MG capsule; Take 1 capsule by mouth Daily.  Dispense: 90 capsule; Refill: 1    4. Anxiety  Comments:  well controlled, cont current medications  Orders:  -     venlafaxine XR (EFFEXOR-XR) 37.5 MG 24 hr capsule; Take 1 capsule by mouth Daily.  Dispense: 90 capsule; Refill: 1  -     venlafaxine XR (EFFEXOR-XR) 75 MG 24 hr capsule; Take 1 capsule by mouth Daily.  Dispense: 90 capsule; Refill: 1    5. Lipoma of neck  -     US Soft Tissue; Future         Tobacco Use: Low Risk    • Smoking Tobacco Use: Never Smoker   • Smokeless Tobacco Use: Never Used       Follow Up     Return in about 6 months (around 3/14/2023).      Patient was given instructions and counseling regarding his condition or for health maintenance advice. Please see specific information pulled into the AVS if appropriate.   I have reviewed information obtained and documented by others and I have confirmed the accuracy of this documented note.    ADALBERTO Salazar

## 2022-10-10 ENCOUNTER — HOSPITAL ENCOUNTER (OUTPATIENT)
Dept: ULTRASOUND IMAGING | Facility: HOSPITAL | Age: 42
Discharge: HOME | End: 2022-10-10
Admitting: NURSE PRACTITIONER

## 2022-10-10 DIAGNOSIS — D17.0 LIPOMA OF NECK: ICD-10-CM

## 2022-10-10 PROCEDURE — 76999 ECHO EXAMINATION PROCEDURE: CPT

## 2022-10-11 DIAGNOSIS — D17.0 LIPOMA OF NECK: Primary | ICD-10-CM

## 2022-11-29 ENCOUNTER — TELEPHONE (OUTPATIENT)
Dept: PLASTIC SURGERY | Facility: CLINIC | Age: 42
End: 2022-11-29

## 2023-01-17 NOTE — PROGRESS NOTES
"Consult (Growth on back of neck)            History of Present Illness  Daniele Bowers is a 42 y.o. male who presents to [facility] as a consult from ADALBERTO Boyer for fatty growth on back of neck.    US Soft tissue 10/10/22-Findings Ultrasound evaluation of the area of interest demonstrates no discrete mass or cyst.  Prominent   subcutaneous fat is noted  IMPRESSION:               No mass or cyst demonstrated  Patient states that this mass has been slowly enlarging for greater than 1 year.  Denies any trauma to the area.  He has not been previously biopsied.  No prior history of inclusion cyst or lipoma.           Subjective       Patient has no known allergies.  Allergies Reconciled.    Review of Systems    All review of system has been reviewed and it  is negative except the ones note above.     Objective     /85 (BP Location: Right arm, Patient Position: Sitting)   Pulse 62   Temp 98.2 °F (36.8 °C) (Temporal)   Ht 185.4 cm (73\")   Wt 101 kg (223 lb)   SpO2 95%   BMI 29.42 kg/m²     Body mass index is 29.42 kg/m².    Physical Exam  Trunk  The patient has a nearly 3 cm raised soft subcutaneous mass with no overlying skin changes.  Nontender.  No ulceration or bleeding.  No erythema.  No satellite lesions.  Located at the junction between the cervical and thoracic spine.  At the midline, over the spinous processes.  Result Review :       Procedures         Assessment and Plan      Diagnoses and all orders for this visit:    1. 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  He understands there will be pain with the injection of the local anesthetic.  • Discussed to continue taking aspirin for afib.  The patient advised to continue taking his medicine because of his cardiac condition.  This will increase the risk for " bleeding, bruising, hematoma.  • The risks and benefits of the procedure were discussed with the patient.  The risk described included, but not limited to, bleeding, infection, need for revision surgeries, seroma, hematoma, poor cosmetic result, poor functional result, edge necrosis, edge separation.  If he develops a postop hematoma, it will be drained in clinic and the wound will be left open, to heal by secondary intention.  •   •   CPT code 49576      Follow Up     Pt will return to clinic for procedure upon insurance approval.    Return return to clinic for procedure upon insurance approval.    Scribed by Ilene Duran, acting as a scribe for Collin Martel MD, 01/19/23 10:34 EST.  Collin Martel MD's signature on the note affirms that the note adequately documents the care provided.      Patient was given instructions and counseling regarding his condition. Please see specific information pulled into the AVS if appropriate.     Collin Martel MD  01/19/2023

## 2023-01-19 ENCOUNTER — OFFICE VISIT (OUTPATIENT)
Dept: PLASTIC SURGERY | Facility: CLINIC | Age: 43
End: 2023-01-19
Payer: COMMERCIAL

## 2023-01-19 VITALS
HEIGHT: 73 IN | WEIGHT: 223 LBS | SYSTOLIC BLOOD PRESSURE: 123 MMHG | OXYGEN SATURATION: 95 % | TEMPERATURE: 98.2 F | DIASTOLIC BLOOD PRESSURE: 85 MMHG | BODY MASS INDEX: 29.55 KG/M2 | HEART RATE: 62 BPM

## 2023-01-19 DIAGNOSIS — D48.5 NEOPLASM OF UNCERTAIN BEHAVIOR OF SKIN OF TRUNK: Primary | ICD-10-CM

## 2023-01-19 PROCEDURE — 99203 OFFICE O/P NEW LOW 30 MIN: CPT | Performed by: SURGERY

## 2023-01-28 DIAGNOSIS — I48.91 ATRIAL FIBRILLATION, UNSPECIFIED TYPE: ICD-10-CM

## 2023-01-30 RX ORDER — SOTALOL HYDROCHLORIDE 80 MG/1
TABLET ORAL
Qty: 90 TABLET | Refills: 3 | Status: SHIPPED | OUTPATIENT
Start: 2023-01-30 | End: 2023-01-31

## 2023-01-31 ENCOUNTER — OFFICE VISIT (OUTPATIENT)
Dept: CARDIOLOGY | Facility: CLINIC | Age: 43
End: 2023-01-31
Payer: COMMERCIAL

## 2023-01-31 VITALS
HEIGHT: 73 IN | DIASTOLIC BLOOD PRESSURE: 73 MMHG | SYSTOLIC BLOOD PRESSURE: 144 MMHG | HEART RATE: 62 BPM | BODY MASS INDEX: 30.93 KG/M2 | WEIGHT: 233.4 LBS

## 2023-01-31 DIAGNOSIS — I10 PRIMARY HYPERTENSION: ICD-10-CM

## 2023-01-31 DIAGNOSIS — I48.0 PAROXYSMAL ATRIAL FIBRILLATION: Primary | ICD-10-CM

## 2023-01-31 PROCEDURE — 99214 OFFICE O/P EST MOD 30 MIN: CPT | Performed by: INTERNAL MEDICINE

## 2023-01-31 RX ORDER — PROPAFENONE HYDROCHLORIDE 300 MG/1
300 TABLET, COATED ORAL DAILY PRN
Qty: 10 TABLET | Refills: 3 | Status: SHIPPED | OUTPATIENT
Start: 2023-01-31

## 2023-01-31 NOTE — PROGRESS NOTES
Chief Complaint  Atrial Fibrillation, Hypertension, and Hyperlipidemia    Subjective        Daniele Bowers presents to [facility] CARDIOLOGY  History of present illness:    Patient states overall he is doing well.  He states he was diagnosed with atrial fibrillation 7 years ago.  He felt his heart racing and fluttering at the time.  It started at night but then it stopped.  The next day he went to work and it was racing nonstop.  He ended up going to the emergency department.  He was admitted and they tried medications to convert him but were unsuccessful.  He then underwent direct-current cardioversion.  At that time he was put on sotalol and he has been on sotalol for the last 7 years.  He has had no other palpitations.  He has an apple watch that he wears and they can  atrial fibrillation but he has not had any episodes.  He is very active.  He does not smoke.      Past Medical History:   Diagnosis Date   • Allergies    • Anxiety    • Atrial fibrillation (HCC)    • GERD (gastroesophageal reflux disease)    • Hypertension    • Paroxysmal atrial fibrillation (HCC) 1/25/2022         Past Surgical History:   Procedure Laterality Date   • APPENDECTOMY            Social History     Socioeconomic History   • Marital status:    Tobacco Use   • Smoking status: Never   • Smokeless tobacco: Never   Vaping Use   • Vaping Use: Never used   Substance and Sexual Activity   • Alcohol use: Never   • Drug use: Never   • Sexual activity: Defer         Family History   Problem Relation Age of Onset   • Diabetes Mother    • Diabetes Father    • Heart disease Other           No Known Allergies         Current Outpatient Medications:   •  levocetirizine (XYZAL) 5 MG tablet, Take 5 mg by mouth Every Evening., Disp: , Rfl:   •  losartan (COZAAR) 50 MG tablet, Take 1 tablet by mouth Daily., Disp: 90 tablet, Rfl: 1  •  omeprazole (priLOSEC) 40 MG capsule, Take 1 capsule by mouth Daily., Disp: 90 capsule,  "Rfl: 1  •  venlafaxine XR (EFFEXOR-XR) 37.5 MG 24 hr capsule, Take 1 capsule by mouth Daily., Disp: 90 capsule, Rfl: 1  •  venlafaxine XR (EFFEXOR-XR) 75 MG 24 hr capsule, Take 1 capsule by mouth Daily., Disp: 90 capsule, Rfl: 1  •  propafenone (RYTHMOL) 300 MG tablet, Take 1 tablet by mouth Daily As Needed (palpitations.  May repeat one hour later if still palpitations.)., Disp: 10 tablet, Rfl: 3      ROS:  Cardiac review of systems is negative.    Objective     /73   Pulse 62   Ht 185.4 cm (73\")   Wt 106 kg (233 lb 6.4 oz)   BMI 30.79 kg/m²       General Appearance:   · well developed  · well nourished  HENT:   · oropharynx moist  · lips not cyanotic  Respiratory:  · no respiratory distress  · normal breath sounds  · no rales  Cardiovascular:  · no jugular venous distention  · regular rhythm  · S1 normal, S2 normal  · no S3, no S4   · no murmur  · no rub, no thrill  · No carotid bruit  · pedal pulses normal  · lower extremity edema: none    Musculoskeletal:  · no clubbing of fingers.   · normocephalic, head atraumatic  Skin:   · warm, dry  Psychiatric:  · judgement and insight appropriate  · normal mood and affect    ECHO:    STRESS:    CATH:  No results found for this or any previous visit.    BMP:   Glucose   Date Value Ref Range Status   07/21/2022 98 65 - 99 mg/dL Final     BUN   Date Value Ref Range Status   07/21/2022 11 6 - 20 mg/dL Final     Creatinine   Date Value Ref Range Status   07/21/2022 0.95 0.76 - 1.27 mg/dL Final     Sodium   Date Value Ref Range Status   07/21/2022 141 136 - 145 mmol/L Final     Potassium   Date Value Ref Range Status   07/21/2022 4.2 3.5 - 5.2 mmol/L Final     Chloride   Date Value Ref Range Status   07/21/2022 104 98 - 107 mmol/L Final     CO2   Date Value Ref Range Status   07/21/2022 26.1 22.0 - 29.0 mmol/L Final     Calcium   Date Value Ref Range Status   07/21/2022 9.4 8.6 - 10.5 mg/dL Final     BUN/Creatinine Ratio   Date Value Ref Range Status   07/21/2022 11.6 " 7.0 - 25.0 Final     Anion Gap   Date Value Ref Range Status   07/21/2022 10.9 5.0 - 15.0 mmol/L Final     eGFR   Date Value Ref Range Status   07/21/2022 103.1 >60.0 mL/min/1.73 Final     Comment:     National Kidney Foundation and American Society of Nephrology (ASN) Task Force recommended calculation based on the Chronic Kidney Disease Epidemiology Collaboration (CKD-EPI) equation refit without adjustment for race.     LIPIDS:  Total Cholesterol   Date Value Ref Range Status   07/21/2022 191 0 - 200 mg/dL Final     Triglycerides   Date Value Ref Range Status   07/21/2022 111 0 - 150 mg/dL Final     HDL Cholesterol   Date Value Ref Range Status   07/21/2022 35 (L) 40 - 60 mg/dL Final     LDL Cholesterol    Date Value Ref Range Status   07/21/2022 136 (H) 0 - 100 mg/dL Final     VLDL Cholesterol   Date Value Ref Range Status   07/21/2022 20 5 - 40 mg/dL Final     LDL/HDL Ratio   Date Value Ref Range Status   07/21/2022 3.82  Final         Procedures             ASSESSMENT:  Encounter Diagnoses   Name Primary?   • Paroxysmal atrial fibrillation (HCC) Yes   • Primary hypertension          PLAN:    1.  Patient had this 1 episode of atrial fibrillation at a very young age.  He ended up in the emergency department due to the symptoms and eventually went for cardioversion a couple days later.  He has been on sotalol ever since this time (7 years) and has had no palpitations and has worn an apple watch that has not shown any atrial fibrillation.  I discussed with him and I would recommend stopping the sotalol at this time.  I would go with just the pill in the pocket approach with propafenone.  If he notes palpitations that are long-acting he will take a pill and can repeat at 1 hour later if needed.  He will call us if he has to take a pill.  If we are seeing that this is coming back off the sotalol then I would lean towards sending him for an ablation versus long-term sotalol.  His [id number] score is 1 so he does not  need anticoagulation.  2.  Blood pressure the last 4 times were perfect but today is just minimally elevated.  We will continue to monitor.  3.  Patient had cholesterol checked 7/21/2022 with , HDL 35, and triglycerides 116.  These are near goal.  Just recommended diet and exercise.  4.  Encourage the patient to continue to remain active.          Patient was given instructions and counseling regarding his condition or for health maintenance advice. Please see specific information pulled into the AVS if appropriate.         Tony Leon MD   1/31/2023  15:31 EST

## 2023-02-27 ENCOUNTER — TELEPHONE (OUTPATIENT)
Dept: PLASTIC SURGERY | Facility: CLINIC | Age: 43
End: 2023-02-27
Payer: COMMERCIAL

## 2023-02-27 DIAGNOSIS — F41.9 ANXIETY: ICD-10-CM

## 2023-02-27 RX ORDER — VENLAFAXINE HYDROCHLORIDE 75 MG/1
75 CAPSULE, EXTENDED RELEASE ORAL DAILY
Qty: 90 CAPSULE | Refills: 1 | Status: SHIPPED | OUTPATIENT
Start: 2023-02-27

## 2023-03-08 ENCOUNTER — TELEPHONE (OUTPATIENT)
Dept: PLASTIC SURGERY | Facility: CLINIC | Age: 43
End: 2023-03-08
Payer: COMMERCIAL

## 2023-03-08 NOTE — TELEPHONE ENCOUNTER
LVM FOR PATIENT TO GET SCHEDULE FOR IN OFFICE PROCEDURE WITH DR. DHILLON FOR BACK MASS EXCISION HERE IN THE OFFICE.

## 2023-03-20 NOTE — PROGRESS NOTES
"Chief Complaint  Procedure (In office fatty tissue removal on upper back)    Subjective              History of Present Illness  Daniele Bowers is a 42 y.o. male who presents to [facility] for in office excision of fatty tissue removal on back.  The risks and benefits of the procedure were again discussed with the patient.  His questions were answered.  He still agrees to the procedure.  Allergies: Patient has no known allergies.  Allergies Reconciled.    Review of Systems   All review of system has been reviewed and it  is negative except the ones note above.     Objective     /87 (BP Location: Left arm, Patient Position: Sitting, Cuff Size: Adult)   Pulse 73   Temp 98.7 °F (37.1 °C) (Temporal)   Ht 185.4 cm (72.99\")   Wt 103 kg (226 lb)   SpO2 96%   BMI 29.82 kg/m²     Body mass index is 29.82 kg/m².    Physical Exam    Cardiovascular: Normal rate    Pulmonary/Chest: Effort normal    Face: Patient has had no interval change on the subcutaneous mass at the base of his cervical spine.  Midline posterior lesion.    Result Review :             Excision Procedure: Consent obtained. Local injected to site, Lidocaine 1% with epi.  15 cc of which was used total.  This was allowed infiltrate through the tissue for 15 minutes.  Site prepped with ChloraPrep  in sterile fashion. Site draped in sterile fashion. I dissected down through skin and subcutaneous tissue completely around the 2.5 cm fatty mass, after excision of  was sent from field for pathology. Established hemostasis with direct pressure. Site was thoroughly irrigated. Site closed with 3-0 monocryl in a subcutaneous fashion to obliterate dead space, then with 3-0 Monocryl in an interrupted fashion. Site cleaned with sterile normal saline. Bacitracin applied. The patient tolerated the procedure well with no immediate complications.   The mass was grossly consistent with a fibrofatty mass.  Not a " discrete well-circumscribed lipoma           Assessment and Plan      Diagnoses and all orders for this visit:    1. 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 This time includes time spent by me in the following activities:preparing for the visit and performing the procedure    Follow Up     Return in about 1 week (around 3/28/2023).    Patient was given instructions and counseling regarding his condition. Please see specific information pulled into the AVS if appropriate.     Collin Martel MD  03/21/2023

## 2023-03-21 ENCOUNTER — PROCEDURE VISIT (OUTPATIENT)
Dept: PLASTIC SURGERY | Facility: CLINIC | Age: 43
End: 2023-03-21
Payer: COMMERCIAL

## 2023-03-21 VITALS
OXYGEN SATURATION: 96 % | SYSTOLIC BLOOD PRESSURE: 121 MMHG | TEMPERATURE: 98.7 F | HEART RATE: 73 BPM | HEIGHT: 73 IN | BODY MASS INDEX: 29.95 KG/M2 | DIASTOLIC BLOOD PRESSURE: 87 MMHG | WEIGHT: 226 LBS

## 2023-03-21 DIAGNOSIS — D48.1: Primary | ICD-10-CM

## 2023-03-21 PROCEDURE — 88304 TISSUE EXAM BY PATHOLOGIST: CPT | Performed by: SURGERY

## 2023-03-21 PROCEDURE — 11403 EXC TR-EXT B9+MARG 2.1-3CM: CPT | Performed by: SURGERY

## 2023-03-23 LAB
CYTO UR: NORMAL
LAB AP CASE REPORT: NORMAL
LAB AP CLINICAL INFORMATION: NORMAL
PATH REPORT.FINAL DX SPEC: NORMAL
PATH REPORT.GROSS SPEC: NORMAL

## 2023-04-28 DIAGNOSIS — I10 ESSENTIAL HYPERTENSION: ICD-10-CM

## 2023-05-01 RX ORDER — LOSARTAN POTASSIUM 50 MG/1
50 TABLET ORAL DAILY
Qty: 90 TABLET | Refills: 0 | Status: SHIPPED | OUTPATIENT
Start: 2023-05-01

## 2023-07-27 DIAGNOSIS — I10 ESSENTIAL HYPERTENSION: ICD-10-CM

## 2023-07-27 NOTE — TELEPHONE ENCOUNTER
Last OV 9/14/22  Cancelled 3/14/23  No f/u appt scheduled - sent patient [employer] message to schedule f/u

## 2023-07-28 RX ORDER — LOSARTAN POTASSIUM 50 MG/1
50 TABLET ORAL DAILY
Qty: 30 TABLET | Refills: 0 | Status: SHIPPED | OUTPATIENT
Start: 2023-07-28

## 2023-09-05 DIAGNOSIS — F41.9 ANXIETY: ICD-10-CM

## 2023-09-05 DIAGNOSIS — I10 ESSENTIAL HYPERTENSION: ICD-10-CM

## 2023-09-05 RX ORDER — VENLAFAXINE HYDROCHLORIDE 37.5 MG/1
37.5 CAPSULE, EXTENDED RELEASE ORAL DAILY
Qty: 90 CAPSULE | Refills: 1 | OUTPATIENT
Start: 2023-09-05

## 2023-09-05 RX ORDER — VENLAFAXINE HYDROCHLORIDE 75 MG/1
75 CAPSULE, EXTENDED RELEASE ORAL DAILY
Qty: 30 CAPSULE | Refills: 0 | Status: SHIPPED | OUTPATIENT
Start: 2023-09-05 | End: 2023-09-06 | Stop reason: SDUPTHER

## 2023-09-05 RX ORDER — LOSARTAN POTASSIUM 50 MG/1
50 TABLET ORAL DAILY
Qty: 30 TABLET | Refills: 0 | OUTPATIENT
Start: 2023-09-05

## 2023-09-05 NOTE — TELEPHONE ENCOUNTER
08/26/23  30-day supply given, patient needs appointment         I dont think the pt was notified. No appt has been made nor any documentation of attempting to contact.        (Duplicate. First request only had the Effexor)

## 2023-09-06 ENCOUNTER — TELEPHONE (OUTPATIENT)
Dept: FAMILY MEDICINE CLINIC | Facility: CLINIC | Age: 43
End: 2023-09-06
Payer: COMMERCIAL

## 2023-09-06 DIAGNOSIS — I10 ESSENTIAL HYPERTENSION: ICD-10-CM

## 2023-09-06 DIAGNOSIS — F41.9 ANXIETY: ICD-10-CM

## 2023-09-06 RX ORDER — LOSARTAN POTASSIUM 50 MG/1
50 TABLET ORAL DAILY
Qty: 30 TABLET | Refills: 0 | Status: SHIPPED | OUTPATIENT
Start: 2023-09-06

## 2023-09-06 RX ORDER — VENLAFAXINE HYDROCHLORIDE 75 MG/1
75 CAPSULE, EXTENDED RELEASE ORAL DAILY
Qty: 30 CAPSULE | Refills: 0 | Status: SHIPPED | OUTPATIENT
Start: 2023-09-06

## 2023-09-06 RX ORDER — VENLAFAXINE 37.5 MG/1
37.5 TABLET ORAL DAILY
COMMUNITY

## 2023-09-06 NOTE — TELEPHONE ENCOUNTER
Last OV 9/14/22  Cancelled 3/14/23  No future appt.  Patient notified he must make f/u appt to get refills.  He hung up before  picked up line.  No f/u appt scheduled.

## 2023-09-18 ENCOUNTER — OFFICE VISIT (OUTPATIENT)
Dept: PODIATRY | Facility: CLINIC | Age: 43
End: 2023-09-18
Payer: COMMERCIAL

## 2023-09-18 VITALS
TEMPERATURE: 97.7 F | DIASTOLIC BLOOD PRESSURE: 84 MMHG | HEIGHT: 73 IN | WEIGHT: 214 LBS | BODY MASS INDEX: 28.36 KG/M2 | OXYGEN SATURATION: 96 % | HEART RATE: 67 BPM | SYSTOLIC BLOOD PRESSURE: 125 MMHG

## 2023-09-18 DIAGNOSIS — S93.491A SPRAIN OF ANTERIOR TALOFIBULAR LIGAMENT OF RIGHT ANKLE, INITIAL ENCOUNTER: Primary | ICD-10-CM

## 2023-09-18 PROCEDURE — 99203 OFFICE O/P NEW LOW 30 MIN: CPT | Performed by: PODIATRIST

## 2023-09-18 NOTE — PROGRESS NOTES
[facility] - PODIATRY    Today's Date: 09/18/23    Patient Name: Daniele Bowers  MRN: [id number]  CSN: [id number]  PCP: Penelope An APRN,   Referring Provider: Mimi Bruce,*    SUBJECTIVE     Chief Complaint   Patient presents with    Right Ankle - Pain, Establish Care     Injured 6 weeks ago playing basketball. No treatment until 9/9/23 seen at Urgent Care exam xrays and referred.     HPI: Daniele Bowers, a 43 y.o.male, presents to clinic.    Patient injured ankle playing basketball about 6 weeks ago.  Patient states that it swelled up and was painful it is now not giving him any pain.  His x-ray showed that he may have a break, he has no pain in his ankle at this time now.  Past Medical History:   Diagnosis Date    Allergies     Ankle pain, right     Anxiety     Atrial fibrillation     GERD (gastroesophageal reflux disease)     Hypertension     Paroxysmal atrial fibrillation 01/25/2022     Past Surgical History:   Procedure Laterality Date    APPENDECTOMY       Family History   Problem Relation Age of Onset    Diabetes Mother     Diabetes Father     Heart disease Other      Social History     Socioeconomic History    Marital status:    Tobacco Use    Smoking status: Never    Smokeless tobacco: Never   Vaping Use    Vaping Use: Never used   Substance and Sexual Activity    Alcohol use: Never    Drug use: Never    Sexual activity: Defer     No Known Allergies  Current Outpatient Medications   Medication Sig Dispense Refill    levocetirizine (XYZAL) 5 MG tablet Take 1 tablet by mouth Every Evening.      losartan (COZAAR) 50 MG tablet Take 1 tablet by mouth Daily. 30 tablet 0    omeprazole (priLOSEC) 40 MG capsule Take 1 capsule by mouth Daily. 90 capsule 1    propafenone (RYTHMOL) 300 MG tablet Take 1 tablet by mouth Daily As Needed (palpitations.  May repeat one hour later if still palpitations.). 10 tablet 3    sotalol (BETAPACE) 80 MG tablet Take 0.5 tablets by mouth  Every 12 (Twelve) Hours.      venlafaxine (EFFEXOR) 37.5 MG tablet Take 1 tablet by mouth Daily.      venlafaxine XR (EFFEXOR-XR) 75 MG 24 hr capsule Take 1 capsule by mouth Daily. 30 capsule 0     No current facility-administered medications for this visit.     Review of Systems   All other systems reviewed and are negative.    OBJECTIVE     Vitals:    09/18/23 0727   BP: 125/84   Pulse: 67   Temp: 97.7 °F (36.5 °C)   SpO2: 96%       WBC   Date Value Ref Range Status   01/26/2022 4.02 3.40 - 10.80 10*3/mm3 Final     RBC   Date Value Ref Range Status   01/26/2022 4.87 4.14 - 5.80 10*6/mm3 Final     Hemoglobin   Date Value Ref Range Status   01/26/2022 14.9 13.0 - 17.7 g/dL Final     Hematocrit   Date Value Ref Range Status   01/26/2022 44.5 37.5 - 51.0 % Final     MCV   Date Value Ref Range Status   01/26/2022 91.4 79.0 - 97.0 fL Final     MCH   Date Value Ref Range Status   01/26/2022 30.6 26.6 - 33.0 pg Final     MCHC   Date Value Ref Range Status   01/26/2022 33.5 31.5 - 35.7 g/dL Final     RDW   Date Value Ref Range Status   01/26/2022 12.2 (L) 12.3 - 15.4 % Final     RDW-SD   Date Value Ref Range Status   01/26/2022 40.6 37.0 - 54.0 fl Final     MPV   Date Value Ref Range Status   01/26/2022 9.1 6.0 - 12.0 fL Final     Platelets   Date Value Ref Range Status   01/26/2022 363 140 - 450 10*3/mm3 Final     Neutrophil %   Date Value Ref Range Status   01/26/2022 53.1 42.7 - 76.0 % Final     Lymphocyte %   Date Value Ref Range Status   01/26/2022 31.8 19.6 - 45.3 % Final     Monocyte %   Date Value Ref Range Status   01/26/2022 10.2 5.0 - 12.0 % Final     Eosinophil %   Date Value Ref Range Status   01/26/2022 3.5 0.3 - 6.2 % Final     Basophil %   Date Value Ref Range Status   01/26/2022 1.2 0.0 - 1.5 % Final     Immature Grans %   Date Value Ref Range Status   01/26/2022 0.2 0.0 - 0.5 % Final     Neutrophils, Absolute   Date Value Ref Range Status   01/26/2022 2.13 1.70 - 7.00 10*3/mm3 Final     Lymphocytes,  Absolute   Date Value Ref Range Status   01/26/2022 1.28 0.70 - 3.10 10*3/mm3 Final     Monocytes, Absolute   Date Value Ref Range Status   01/26/2022 0.41 0.10 - 0.90 10*3/mm3 Final     Eosinophils, Absolute   Date Value Ref Range Status   01/26/2022 0.14 0.00 - 0.40 10*3/mm3 Final     Basophils, Absolute   Date Value Ref Range Status   01/26/2022 0.05 0.00 - 0.20 10*3/mm3 Final     Immature Grans, Absolute   Date Value Ref Range Status   01/26/2022 0.01 0.00 - 0.05 10*3/mm3 Final     nRBC   Date Value Ref Range Status   01/26/2022 0.0 0.0 - 0.2 /100 WBC Final         Lab Results   Component Value Date    GLUCOSE 98 07/21/2022    BUN 11 07/21/2022    CREATININE 0.95 07/21/2022    EGFRIFNONA 89 01/26/2022    BCR 11.6 07/21/2022    K 4.2 07/21/2022    CO2 26.1 07/21/2022    CALCIUM 9.4 07/21/2022    ALBUMIN 4.50 07/21/2022    LABIL2 1.5 10/28/2020    AST 24 07/21/2022    ALT 18 07/21/2022       Patient seen in no apparent distress.      PHYSICAL EXAM:     Foot/Ankle Exam    GENERAL  Appearance:  appears stated age  Orientation:  AAOx3  Affect:  appropriate  Gait:  unimpaired  Assistance:  independent  Right shoe gear: casual shoe  Left shoe gear: casual shoe    VASCULAR     Right Foot Vascularity   Normal vascular exam    Dorsalis pedis:  2+  Posterior tibial:  2+  Skin temperature:  warm  Edema grading:  None  CFT:  < 3 seconds  Pedal hair growth:  Present  Varicosities:  none     Left Foot Vascularity   Normal vascular exam    Dorsalis pedis:  2+  Posterior tibial:  2+  Skin temperature:  warm  Edema grading:  None  CFT:  < 3 seconds  Pedal hair growth:  Present  Varicosities:  none     NEUROLOGIC     Right Foot Neurologic   Normal sensation    Light touch sensation: normal  Vibratory sensation: normal  Hot/Cold sensation: normal  Protective Sensation using [place]-Master Monofilament:   Sites intact: 10  Sites tested: 10     Left Foot Neurologic   Normal sensation    Light touch sensation: normal  Vibratory  sensation: normal  Hot/Cold sensation:  normal  Protective Sensation using [place]-Master Monofilament:   Sites intact: 10  Sites tested: 10    MUSCLE STRENGTH     Right Foot Muscle Strength   Foot dorsiflexion:  4  Foot plantar flexion:  4  Foot inversion:  4  Foot eversion:  4     Left Foot Muscle Strength   Foot dorsiflexion:  4  Foot plantar flexion:  4  Foot inversion:  4  Foot eversion:  4    RANGE OF MOTION     Right Foot Range of Motion   Foot and ankle ROM within normal limits       Left Foot Range of Motion   Foot and ankle ROM within normal limits      DERMATOLOGIC      Right Foot Dermatologic   Skin  Right foot skin is intact.      Left Foot Dermatologic   Skin  Left foot skin is intact.     RADIOLOGY:        XR Ankle 3+ View Right    Result Date: 9/9/2023  Narrative: PROCEDURE: XR ANKLE 3+ VW RIGHT  COMPARISON: None  INDICATIONS: Right lateral ankle swelling and mild pain X 1 month after twisting injury.  FINDINGS:  No acute displaced fracture or joint malalignment.  There is soft tissue swelling along the anterior and lateral aspect of the ankle.  Symmetric mortise.  Talar dome unremarkable.  Cortical thickening noted along the lateral aspect of the distal fibular diaphysis.  Small well corticated densities along the medial and lateral malleolar tips.      Impression:   Cortical thickening along the lateral aspect of the distal fibular diaphysis.  Well corticated densities approximating both medial and lateral malleolar tips.  These findings could reflect healing nondisplaced fracture and sequelae of prior ankle sprain respectively, particular the setting of subacute trauma.  There is persistent soft tissue swelling along the anterolateral aspect of the ankle and distal fibula.      CONCHITA CHRISTENSEN MD       Electronically Signed and Approved By: CONCHITA CHRISTENSEN MD on 9/09/2023 at 18:47              ASSESSMENT/PLAN     Diagnoses and all orders for this visit:    1. Sprain of anterior talofibular  ligament of right ankle, initial encounter (Primary)      Patient without pain in his ankle, mild swelling to lateral malleolus.    Patient to begin stretching exercises and icing in the evening as tolerated. Discussed compression therapy and resting the extremity if he has pain.  Anti-inflammatory medication to begin taking if okay by PCP.    Return to clinic as needed, if symptoms return we will need to get MRI on his right ankle.    Comprehensive lower extremity examination and evaluation was performed.    Discussed findings and treatment plan including risks, benefits, and treatment options with patient in detail. Patient agreed with treatment plan.    Medications and allergies reviewed.  Reviewed available lab values along with other pertinent labs.  These were discussed with the patient.    An After Visit Summary was printed and given to the patient at discharge, including (if requested) any available informative/educational handouts regarding diagnosis, treatment, or medications. All questions were answered to patient/family satisfaction. Should symptoms fail to improve or worsen they agree to call or return to clinic or to go to the Emergency Department. Discussed the importance of following up with any needed screening tests/labs/specialist appointments and any requested follow-up recommended by me today. Importance of maintaining follow-up discussed and patient accepts that missed appointments can delay diagnosis and potentially lead to worsening of conditions.    No follow-ups on file., or sooner if acute issues arise.    This document has been electronically signed by Tony Aleman DPM on September 18, 2023 07:46 EDT

## 2023-09-24 DIAGNOSIS — K21.9 GASTROESOPHAGEAL REFLUX DISEASE WITHOUT ESOPHAGITIS: ICD-10-CM

## 2023-09-26 RX ORDER — OMEPRAZOLE 40 MG/1
40 CAPSULE, DELAYED RELEASE ORAL DAILY
Qty: 30 CAPSULE | Refills: 0 | Status: SHIPPED | OUTPATIENT
Start: 2023-09-26

## 2023-10-02 DIAGNOSIS — I10 ESSENTIAL HYPERTENSION: ICD-10-CM

## 2023-10-02 RX ORDER — LOSARTAN POTASSIUM 50 MG/1
50 TABLET ORAL DAILY
Qty: 30 TABLET | Refills: 0 | Status: SHIPPED | OUTPATIENT
Start: 2023-10-02

## 2023-10-12 DIAGNOSIS — F41.9 ANXIETY: ICD-10-CM

## 2023-10-17 RX ORDER — VENLAFAXINE HYDROCHLORIDE 37.5 MG/1
37.5 CAPSULE, EXTENDED RELEASE ORAL DAILY
Qty: 90 CAPSULE | Refills: 1 | Status: SHIPPED | OUTPATIENT
Start: 2023-10-17

## 2023-10-27 DIAGNOSIS — I10 ESSENTIAL HYPERTENSION: ICD-10-CM

## 2023-10-27 DIAGNOSIS — K21.9 GASTROESOPHAGEAL REFLUX DISEASE WITHOUT ESOPHAGITIS: ICD-10-CM

## 2023-11-01 ENCOUNTER — OFFICE VISIT (OUTPATIENT)
Dept: FAMILY MEDICINE CLINIC | Facility: CLINIC | Age: 43
End: 2023-11-01
Payer: COMMERCIAL

## 2023-11-01 VITALS
DIASTOLIC BLOOD PRESSURE: 82 MMHG | HEART RATE: 76 BPM | OXYGEN SATURATION: 98 % | BODY MASS INDEX: 29.48 KG/M2 | SYSTOLIC BLOOD PRESSURE: 129 MMHG | HEIGHT: 73 IN | WEIGHT: 222.4 LBS | TEMPERATURE: 97.8 F

## 2023-11-01 DIAGNOSIS — Z12.5 PROSTATE CANCER SCREENING: ICD-10-CM

## 2023-11-01 DIAGNOSIS — I10 ESSENTIAL HYPERTENSION: ICD-10-CM

## 2023-11-01 DIAGNOSIS — K21.9 GASTROESOPHAGEAL REFLUX DISEASE WITHOUT ESOPHAGITIS: ICD-10-CM

## 2023-11-01 DIAGNOSIS — Z23 FLU VACCINE NEED: ICD-10-CM

## 2023-11-01 DIAGNOSIS — F41.9 ANXIETY: ICD-10-CM

## 2023-11-01 DIAGNOSIS — Z00.00 ANNUAL PHYSICAL EXAM: Primary | ICD-10-CM

## 2023-11-01 RX ORDER — OMEPRAZOLE 40 MG/1
40 CAPSULE, DELAYED RELEASE ORAL DAILY
Qty: 30 CAPSULE | Refills: 0 | OUTPATIENT
Start: 2023-11-01

## 2023-11-01 RX ORDER — LOSARTAN POTASSIUM 50 MG/1
50 TABLET ORAL DAILY
Qty: 30 TABLET | Refills: 0 | OUTPATIENT
Start: 2023-11-01

## 2023-11-01 RX ORDER — VENLAFAXINE HYDROCHLORIDE 75 MG/1
75 CAPSULE, EXTENDED RELEASE ORAL DAILY
Qty: 90 CAPSULE | Refills: 1 | Status: SHIPPED | OUTPATIENT
Start: 2023-11-01

## 2023-11-01 RX ORDER — OMEPRAZOLE 40 MG/1
40 CAPSULE, DELAYED RELEASE ORAL DAILY
Qty: 90 CAPSULE | Refills: 1 | Status: SHIPPED | OUTPATIENT
Start: 2023-11-01

## 2023-11-01 RX ORDER — LOSARTAN POTASSIUM 50 MG/1
50 TABLET ORAL DAILY
Qty: 90 TABLET | Refills: 1 | Status: SHIPPED | OUTPATIENT
Start: 2023-11-01

## 2023-11-01 NOTE — PROGRESS NOTES
Chief Complaint    Annual physical exam with labs  Hypertension, Atrial Fibrillation, Anxiety, and Heartburn    SUBJECTIVE  Daniele Bowers presents to [facility]    Annual physical exam with labs    Hypertension/Afib:  Patient is taking Losartan, Tythmol, Sotalol.  Patient's Blood Pressure in clinic today is 129/82.  Patient does monitor blood pressure at home with readings of 120s/80s.  Patient denies chest pain, shortness of air, headache, flushing, abnormal swelling in feet/ankles.  Patient's cardiologist is Dr. Garrido.    Gastroesophageal Reflux:  Patient is taking Omeprazole, with good control of symptoms.  Patient does not need over the counter medications for breakthrough symptoms.  Patient tries to avoid trigger foods, eat frequent small meals, not lie down within 2 hours of eating, avoids NSAIDS medications and alcohol.    Anxiety:  Patient is taking Venlafaxine with good control of symptoms.    History of Present Illness  Past Medical History:   Diagnosis Date    Allergies     Ankle pain, right     Anxiety     Atrial fibrillation     GERD (gastroesophageal reflux disease)     Hypertension     Paroxysmal atrial fibrillation 01/25/2022      Family History   Problem Relation Age of Onset    Diabetes Mother     Diabetes Father     Heart disease Other       Past Surgical History:   Procedure Laterality Date    APPENDECTOMY          Current Outpatient Medications:     levocetirizine (XYZAL) 5 MG tablet, Take 1 tablet by mouth Every Evening., Disp: , Rfl:     losartan (COZAAR) 50 MG tablet, Take 1 tablet by mouth Daily., Disp: 90 tablet, Rfl: 1    omeprazole (priLOSEC) 40 MG capsule, Take 1 capsule by mouth Daily., Disp: 90 capsule, Rfl: 1    propafenone (RYTHMOL) 300 MG tablet, Take 1 tablet by mouth Daily As Needed (palpitations.  May repeat one hour later if still palpitations.)., Disp: 10 tablet, Rfl: 3    venlafaxine XR (EFFEXOR-XR) 37.5 MG 24 hr capsule, TAKE 1 CAPSULE BY  "MOUTH DAILY, Disp: 90 capsule, Rfl: 1    venlafaxine XR (EFFEXOR-XR) 75 MG 24 hr capsule, Take 1 capsule by mouth Daily., Disp: 90 capsule, Rfl: 1    OBJECTIVE  Vital Signs:   /82 (BP Location: Left arm, Patient Position: Sitting, Cuff Size: Large Adult)   Pulse 76   Temp 97.8 °F (36.6 °C) (Oral)   Ht 185.4 cm (73\")   Wt 101 kg (222 lb 6.4 oz)   SpO2 98%   BMI 29.34 kg/m²    Estimated body mass index is 29.34 kg/m² as calculated from the following:    Height as of this encounter: 185.4 cm (73\").    Weight as of this encounter: 101 kg (222 lb 6.4 oz).     Wt Readings from Last 3 Encounters:   11/01/23 101 kg (222 lb 6.4 oz)   09/18/23 97.1 kg (214 lb)   09/09/23 103 kg (228 lb)     BP Readings from Last 3 Encounters:   11/01/23 129/82   09/18/23 125/84   09/09/23 132/92       Physical Exam  Vitals reviewed.   Constitutional:       Appearance: Normal appearance. He is well-developed.   HENT:      Head: Normocephalic and atraumatic.      Right Ear: External ear normal.      Left Ear: External ear normal.      Mouth/Throat:      Pharynx: No oropharyngeal exudate.   Eyes:      Conjunctiva/sclera: Conjunctivae normal.      Pupils: Pupils are equal, round, and reactive to light.   Neck:      Vascular: No carotid bruit.   Cardiovascular:      Rate and Rhythm: Normal rate and regular rhythm.      Pulses: Normal pulses.      Heart sounds: Normal heart sounds. No murmur heard.     No friction rub. No gallop.   Pulmonary:      Effort: Pulmonary effort is normal.      Breath sounds: Normal breath sounds. No wheezing or rhonchi.   Skin:     General: Skin is warm and dry.   Neurological:      Mental Status: He is alert and oriented to person, place, and time.      Cranial Nerves: No cranial nerve deficit.   Psychiatric:         Mood and Affect: Mood and affect normal.         Behavior: Behavior normal.         Thought Content: Thought content normal.         Judgment: Judgment normal.          Result Review        The " above data has been reviewed by ADALBERTO Salazar 11/01/2023 10:45 EDT.          Patient Care Team:  Penelope An APRN as PCP - General (Family Medicine)  Tony Leon MD as Consulting Physician (Cardiology)            ASSESSMENT & PLAN    Diagnoses and all orders for this visit:    1. Annual physical exam (Primary)  -     Comprehensive Metabolic Panel; Future  -     CBC & Differential; Future  -     TSH; Future  -     Lipid Panel; Future    2. Essential hypertension  Comments:  well controlled, cont current medications  Orders:  -     losartan (COZAAR) 50 MG tablet; Take 1 tablet by mouth Daily.  Dispense: 90 tablet; Refill: 1    3. Gastroesophageal reflux disease without esophagitis  Comments:  well controlled, cont current medications  Orders:  -     omeprazole (priLOSEC) 40 MG capsule; Take 1 capsule by mouth Daily.  Dispense: 90 capsule; Refill: 1    4. Anxiety  Comments:  well controlled, cont current medications  Orders:  -     venlafaxine XR (EFFEXOR-XR) 75 MG 24 hr capsule; Take 1 capsule by mouth Daily.  Dispense: 90 capsule; Refill: 1    5. Flu vaccine need  -     Fluzone >6 Months ([id number])    6. Prostate cancer screening  -     PSA Screen; Future       “Discussed risks/benefits to vaccination, reviewed components of the vaccine, discussed VIS, discussed informed consent, informed consent obtained. Patient/Parent was allowed to accept or refuse vaccine. Questions answered to satisfactory state of patient/Parent. We reviewed typical age appropriate and seasonally appropriate vaccinations. Reviewed immunization history and updated state vaccination form as needed. Patient was counseled on Influenza    The patient is advised to continue current medications, continue current healthy lifestyle patterns, and return for routine annual checkups.      Tobacco Use: Low Risk  (11/1/2023)    Patient History     Smoking Tobacco Use: Never     Smokeless Tobacco Use: Never     Passive Exposure: Not  on file       Follow Up     Return in about 6 months (around 5/1/2024).      Patient was given instructions and counseling regarding his condition or for health maintenance advice. Please see specific information pulled into the AVS if appropriate.   I have reviewed information obtained and documented by others and I have confirmed the accuracy of this documented note.    Penelope An, APRN

## 2023-11-21 ENCOUNTER — OFFICE VISIT (OUTPATIENT)
Dept: CARDIOLOGY | Facility: CLINIC | Age: 43
End: 2023-11-21
Payer: COMMERCIAL

## 2023-11-21 VITALS
WEIGHT: 228 LBS | HEIGHT: 73 IN | BODY MASS INDEX: 30.22 KG/M2 | DIASTOLIC BLOOD PRESSURE: 94 MMHG | SYSTOLIC BLOOD PRESSURE: 149 MMHG | HEART RATE: 83 BPM

## 2023-11-21 DIAGNOSIS — I10 PRIMARY HYPERTENSION: ICD-10-CM

## 2023-11-21 DIAGNOSIS — I48.0 PAROXYSMAL ATRIAL FIBRILLATION: Primary | ICD-10-CM

## 2023-11-21 PROCEDURE — 99213 OFFICE O/P EST LOW 20 MIN: CPT | Performed by: INTERNAL MEDICINE

## 2023-11-21 NOTE — PROGRESS NOTES
Chief Complaint  Paroxysmal atrial fibrillation    Subjective        Daniele Bowers presents to [facility]  History of present illness:    Patient states overall he is doing well from a heart standpoint.  He has not had any palpitations.  He has not had to take any of the as needed propafenone.  His Apple Watch has not alerted to A-fib.  He is active going to the gym several times a week and playing basketball with no chest pain.  He states he did [place] here today and his blood pressure was a little bit elevated.      Past Medical History:   Diagnosis Date    Allergies     Ankle pain, right     Anxiety     Atrial fibrillation     GERD (gastroesophageal reflux disease)     Hypertension     Paroxysmal atrial fibrillation 01/25/2022         Past Surgical History:   Procedure Laterality Date    APPENDECTOMY            Social History     Socioeconomic History    Marital status:    Tobacco Use    Smoking status: Never    Smokeless tobacco: Never   Vaping Use    Vaping Use: Never used   Substance and Sexual Activity    Alcohol use: Never    Drug use: Never    Sexual activity: Defer         Family History   Problem Relation Age of Onset    Diabetes Mother     Diabetes Father     Heart disease Other           No Known Allergies         Current Outpatient Medications:     levocetirizine (XYZAL) 5 MG tablet, Take 1 tablet by mouth Every Evening., Disp: , Rfl:     losartan (COZAAR) 50 MG tablet, Take 1 tablet by mouth Daily., Disp: 90 tablet, Rfl: 1    omeprazole (priLOSEC) 40 MG capsule, Take 1 capsule by mouth Daily., Disp: 90 capsule, Rfl: 1    propafenone (RYTHMOL) 300 MG tablet, Take 1 tablet by mouth Daily As Needed (palpitations.  May repeat one hour later if still palpitations.)., Disp: 10 tablet, Rfl: 3    venlafaxine XR (EFFEXOR-XR) 37.5 MG 24 hr capsule, TAKE 1 CAPSULE BY MOUTH DAILY, Disp: 90 capsule, Rfl: 1    venlafaxine XR (EFFEXOR-XR) 75 MG 24 hr capsule, Take 1 capsule by  "mouth Daily., Disp: 90 capsule, Rfl: 1      ROS:  Cardiac review of systems negative.    Objective     /94   Pulse 83   Ht 185.4 cm (73\")   Wt 103 kg (228 lb)   BMI 30.08 kg/m²       General Appearance:   well developed  well nourished  HENT:   oropharynx moist  lips not cyanotic  Respiratory:  no respiratory distress  normal breath sounds  no rales  Cardiovascular:  no jugular venous distention  regular rhythm  S1 normal, S2 normal  no S3, no S4   no murmur  no rub, no thrill  No carotid bruit  pedal pulses normal  lower extremity edema: none    Musculoskeletal:  no clubbing of fingers.   normocephalic, head atraumatic  Skin:   warm, dry  Psychiatric:  judgement and insight appropriate  normal mood and affect    ECHO:    STRESS:    CATH:  No results found for this or any previous visit.    BMP:     Glucose   Date Value Ref Range Status   07/21/2022 98 65 - 99 mg/dL Final     BUN   Date Value Ref Range Status   07/21/2022 11 6 - 20 mg/dL Final     Creatinine   Date Value Ref Range Status   07/21/2022 0.95 0.76 - 1.27 mg/dL Final     Sodium   Date Value Ref Range Status   07/21/2022 141 136 - 145 mmol/L Final     Potassium   Date Value Ref Range Status   07/21/2022 4.2 3.5 - 5.2 mmol/L Final     Chloride   Date Value Ref Range Status   07/21/2022 104 98 - 107 mmol/L Final     CO2   Date Value Ref Range Status   07/21/2022 26.1 22.0 - 29.0 mmol/L Final     Calcium   Date Value Ref Range Status   07/21/2022 9.4 8.6 - 10.5 mg/dL Final     BUN/Creatinine Ratio   Date Value Ref Range Status   07/21/2022 11.6 7.0 - 25.0 Final     Anion Gap   Date Value Ref Range Status   07/21/2022 10.9 5.0 - 15.0 mmol/L Final     eGFR   Date Value Ref Range Status   07/21/2022 103.1 >60.0 mL/min/1.73 Final     Comment:     National Kidney Foundation and American Society of Nephrology (ASN) Task Force recommended calculation based on the Chronic Kidney Disease Epidemiology Collaboration (CKD-EPI) equation refit without adjustment " for race.     LIPIDS:  Total Cholesterol   Date Value Ref Range Status   07/21/2022 191 0 - 200 mg/dL Final     Triglycerides   Date Value Ref Range Status   07/21/2022 111 0 - 150 mg/dL Final     HDL Cholesterol   Date Value Ref Range Status   07/21/2022 35 (L) 40 - 60 mg/dL Final     LDL Cholesterol    Date Value Ref Range Status   07/21/2022 136 (H) 0 - 100 mg/dL Final     VLDL Cholesterol   Date Value Ref Range Status   07/21/2022 20 5 - 40 mg/dL Final     LDL/HDL Ratio   Date Value Ref Range Status   07/21/2022 3.82  Final         Procedures             ASSESSMENT:  Diagnoses and all orders for this visit:    1. Paroxysmal atrial fibrillation (Primary)    2. Primary hypertension         PLAN:    1.  Patient had this lone episode of atrial fibrillation 7 years ago where he actually required direct-current cardioversion.  He was put on sotalol for 7 years.  We did stop it the last time we saw him.  We did give him the propafenone just to use as needed.  He did feel his heart racing when he went into atrial fibrillation 7 years ago.  He also has an Apple Watch that can alert him.  2.  Blood pressure slightly elevated but he was running a little bit late and rushed in here.  The last 4 times has been under excellent control.  We will continue the losartan.  3.  Patient's cholesterol is just been at the borderline range.  He is going to have it rechecked through his primary care's office.  4.  We will see the patient back in 1 year.  If he has had no further episodes we will give him the option of just seeing us back as needed.      No follow-ups on file.     Patient was given instructions and counseling regarding his condition or for health maintenance advice. Please see specific information pulled into the AVS if appropriate.         Tony Leon MD   11/21/2023  15:22 EST

## 2024-01-07 ENCOUNTER — APPOINTMENT (OUTPATIENT)
Dept: GENERAL RADIOLOGY | Facility: HOSPITAL | Age: 44
End: 2024-01-07
Payer: COMMERCIAL

## 2024-01-07 ENCOUNTER — HOSPITAL ENCOUNTER (INPATIENT)
Facility: HOSPITAL | Age: 44
LOS: 1 days | Discharge: HOME | End: 2024-01-08
Attending: EMERGENCY MEDICINE | Admitting: STUDENT IN AN ORGANIZED HEALTH CARE EDUCATION/TRAINING PROGRAM
Payer: COMMERCIAL

## 2024-01-07 DIAGNOSIS — I48.91 ATRIAL FIBRILLATION WITH RAPID VENTRICULAR RESPONSE: Primary | ICD-10-CM

## 2024-01-07 LAB
ALBUMIN SERPL-MCNC: 4.8 G/DL (ref 3.5–5.2)
ALBUMIN/GLOB SERPL: 1.3 G/DL
ALP SERPL-CCNC: 77 U/L (ref 39–117)
ALT SERPL W P-5'-P-CCNC: 28 U/L (ref 1–41)
ANION GAP SERPL CALCULATED.3IONS-SCNC: 19.2 MMOL/L (ref 5–15)
AST SERPL-CCNC: 29 U/L (ref 1–40)
BASOPHILS # BLD AUTO: 0.08 10*3/MM3 (ref 0–0.2)
BASOPHILS NFR BLD AUTO: 1.1 % (ref 0–1.5)
BILIRUB SERPL-MCNC: 0.4 MG/DL (ref 0–1.2)
BUN SERPL-MCNC: 11 MG/DL (ref 6–20)
BUN/CREAT SERPL: 7.6 (ref 7–25)
CALCIUM SPEC-SCNC: 10.3 MG/DL (ref 8.6–10.5)
CHLORIDE SERPL-SCNC: 100 MMOL/L (ref 98–107)
CO2 SERPL-SCNC: 19.8 MMOL/L (ref 22–29)
CREAT SERPL-MCNC: 1.44 MG/DL (ref 0.76–1.27)
DEPRECATED RDW RBC AUTO: 36.8 FL (ref 37–54)
EGFRCR SERPLBLD CKD-EPI 2021: 62.2 ML/MIN/1.73
EOSINOPHIL # BLD AUTO: 0.11 10*3/MM3 (ref 0–0.4)
EOSINOPHIL NFR BLD AUTO: 1.5 % (ref 0.3–6.2)
ERYTHROCYTE [DISTWIDTH] IN BLOOD BY AUTOMATED COUNT: 11.9 % (ref 12.3–15.4)
GLOBULIN UR ELPH-MCNC: 3.6 GM/DL
GLUCOSE SERPL-MCNC: 141 MG/DL (ref 65–99)
HCT VFR BLD AUTO: 44.3 % (ref 37.5–51)
HGB BLD-MCNC: 15.5 G/DL (ref 13–17.7)
HOLD SPECIMEN: NORMAL
HOLD SPECIMEN: NORMAL
IMM GRANULOCYTES # BLD AUTO: 0.02 10*3/MM3 (ref 0–0.05)
IMM GRANULOCYTES NFR BLD AUTO: 0.3 % (ref 0–0.5)
LYMPHOCYTES # BLD AUTO: 2.2 10*3/MM3 (ref 0.7–3.1)
LYMPHOCYTES NFR BLD AUTO: 29.3 % (ref 19.6–45.3)
MAGNESIUM SERPL-MCNC: 1.9 MG/DL (ref 1.6–2.6)
MCH RBC QN AUTO: 29.9 PG (ref 26.6–33)
MCHC RBC AUTO-ENTMCNC: 35 G/DL (ref 31.5–35.7)
MCV RBC AUTO: 85.4 FL (ref 79–97)
MONOCYTES # BLD AUTO: 0.56 10*3/MM3 (ref 0.1–0.9)
MONOCYTES NFR BLD AUTO: 7.5 % (ref 5–12)
NEUTROPHILS NFR BLD AUTO: 4.53 10*3/MM3 (ref 1.7–7)
NEUTROPHILS NFR BLD AUTO: 60.3 % (ref 42.7–76)
NRBC BLD AUTO-RTO: 0 /100 WBC (ref 0–0.2)
PLATELET # BLD AUTO: 402 10*3/MM3 (ref 140–450)
PMV BLD AUTO: 8.7 FL (ref 6–12)
POTASSIUM SERPL-SCNC: 3.6 MMOL/L (ref 3.5–5.2)
PROT SERPL-MCNC: 8.4 G/DL (ref 6–8.5)
RBC # BLD AUTO: 5.19 10*6/MM3 (ref 4.14–5.8)
SODIUM SERPL-SCNC: 139 MMOL/L (ref 136–145)
T4 FREE SERPL-MCNC: 1.15 NG/DL (ref 0.93–1.7)
TROPONIN T SERPL HS-MCNC: 25 NG/L
TSH SERPL DL<=0.05 MIU/L-ACNC: 2.8 UIU/ML (ref 0.27–4.2)
WBC NRBC COR # BLD AUTO: 7.5 10*3/MM3 (ref 3.4–10.8)
WHOLE BLOOD HOLD COAG: NORMAL
WHOLE BLOOD HOLD SPECIMEN: NORMAL

## 2024-01-07 PROCEDURE — 83735 ASSAY OF MAGNESIUM: CPT | Performed by: EMERGENCY MEDICINE

## 2024-01-07 PROCEDURE — 80050 GENERAL HEALTH PANEL: CPT | Performed by: EMERGENCY MEDICINE

## 2024-01-07 PROCEDURE — 99285 EMERGENCY DEPT VISIT HI MDM: CPT

## 2024-01-07 PROCEDURE — 99222 1ST HOSP IP/OBS MODERATE 55: CPT | Performed by: STUDENT IN AN ORGANIZED HEALTH CARE EDUCATION/TRAINING PROGRAM

## 2024-01-07 PROCEDURE — 93005 ELECTROCARDIOGRAM TRACING: CPT

## 2024-01-07 PROCEDURE — 25010000002 LORAZEPAM PER 2 MG: Performed by: EMERGENCY MEDICINE

## 2024-01-07 PROCEDURE — 84439 ASSAY OF FREE THYROXINE: CPT | Performed by: EMERGENCY MEDICINE

## 2024-01-07 PROCEDURE — 25010000002 ONDANSETRON PER 1 MG: Performed by: EMERGENCY MEDICINE

## 2024-01-07 PROCEDURE — 93005 ELECTROCARDIOGRAM TRACING: CPT | Performed by: EMERGENCY MEDICINE

## 2024-01-07 PROCEDURE — 84484 ASSAY OF TROPONIN QUANT: CPT | Performed by: EMERGENCY MEDICINE

## 2024-01-07 PROCEDURE — 71045 X-RAY EXAM CHEST 1 VIEW: CPT

## 2024-01-07 RX ORDER — SODIUM CHLORIDE 0.9 % (FLUSH) 0.9 %
10 SYRINGE (ML) INJECTION AS NEEDED
Status: DISCONTINUED | OUTPATIENT
Start: 2024-01-07 | End: 2024-01-08 | Stop reason: HOSPADM

## 2024-01-07 RX ORDER — ONDANSETRON 2 MG/ML
4 INJECTION INTRAMUSCULAR; INTRAVENOUS ONCE
Status: COMPLETED | OUTPATIENT
Start: 2024-01-07 | End: 2024-01-07

## 2024-01-07 RX ORDER — DILTIAZEM HCL IN NACL,ISO-OSM 125 MG/125
5-15 PLASTIC BAG, INJECTION (ML) INTRAVENOUS
Status: DISCONTINUED | OUTPATIENT
Start: 2024-01-07 | End: 2024-01-08 | Stop reason: HOSPADM

## 2024-01-07 RX ORDER — FAMOTIDINE 10 MG/ML
20 INJECTION, SOLUTION INTRAVENOUS ONCE
Status: COMPLETED | OUTPATIENT
Start: 2024-01-07 | End: 2024-01-07

## 2024-01-07 RX ORDER — LORAZEPAM 2 MG/ML
1 INJECTION INTRAMUSCULAR ONCE
Status: COMPLETED | OUTPATIENT
Start: 2024-01-07 | End: 2024-01-07

## 2024-01-07 RX ORDER — DILTIAZEM HYDROCHLORIDE 5 MG/ML
20 INJECTION INTRAVENOUS ONCE
Status: COMPLETED | OUTPATIENT
Start: 2024-01-07 | End: 2024-01-07

## 2024-01-07 RX ORDER — DILTIAZEM HYDROCHLORIDE 5 MG/ML
INJECTION INTRAVENOUS
Status: DISPENSED
Start: 2024-01-07 | End: 2024-01-08

## 2024-01-07 RX ADMIN — ONDANSETRON 4 MG: 2 INJECTION INTRAMUSCULAR; INTRAVENOUS at 22:23

## 2024-01-07 RX ADMIN — FAMOTIDINE 20 MG: 10 INJECTION INTRAVENOUS at 22:23

## 2024-01-07 RX ADMIN — ONDANSETRON 4 MG: 2 INJECTION INTRAMUSCULAR; INTRAVENOUS at 21:53

## 2024-01-07 RX ADMIN — DILTIAZEM HYDROCHLORIDE 20 MG: 5 INJECTION, SOLUTION INTRAVENOUS at 21:52

## 2024-01-07 RX ADMIN — LORAZEPAM 1 MG: 2 INJECTION INTRAMUSCULAR; INTRAVENOUS at 21:57

## 2024-01-07 RX ADMIN — DILTIAZEM HYDROCHLORIDE 5 MG/HR: 5 INJECTION INTRAVENOUS at 22:35

## 2024-01-07 NOTE — LETTER
January 8, 2024     Patient: Daniele Bowers   YOB: 1980   Date of Visit: 1/7/2024 - 1/8/2024       To Whom It May Concern:    It is my medical opinion that Daniele Bowers may return to work on Thursday, January 11th, 2024 .     Sincerely,    HERMELINDA Leon MD

## 2024-01-08 ENCOUNTER — READMISSION MANAGEMENT (OUTPATIENT)
Dept: CALL CENTER | Facility: HOSPITAL | Age: 44
End: 2024-01-08
Payer: COMMERCIAL

## 2024-01-08 VITALS
BODY MASS INDEX: 30.13 KG/M2 | DIASTOLIC BLOOD PRESSURE: 81 MMHG | TEMPERATURE: 98.7 F | OXYGEN SATURATION: 99 % | HEART RATE: 75 BPM | RESPIRATION RATE: 18 BRPM | SYSTOLIC BLOOD PRESSURE: 121 MMHG | HEIGHT: 72 IN | WEIGHT: 222.44 LBS

## 2024-01-08 LAB
ANION GAP SERPL CALCULATED.3IONS-SCNC: 10 MMOL/L (ref 5–15)
BUN SERPL-MCNC: 13 MG/DL (ref 6–20)
BUN/CREAT SERPL: 11.8 (ref 7–25)
CALCIUM SPEC-SCNC: 9.3 MG/DL (ref 8.6–10.5)
CHLORIDE SERPL-SCNC: 105 MMOL/L (ref 98–107)
CO2 SERPL-SCNC: 24 MMOL/L (ref 22–29)
CREAT SERPL-MCNC: 1.1 MG/DL (ref 0.76–1.27)
EGFRCR SERPLBLD CKD-EPI 2021: 86 ML/MIN/1.73
GEN 5 2HR TROPONIN T REFLEX: 25 NG/L
GLUCOSE SERPL-MCNC: 99 MG/DL (ref 65–99)
MAGNESIUM SERPL-MCNC: 2.1 MG/DL (ref 1.6–2.6)
POTASSIUM SERPL-SCNC: 4.1 MMOL/L (ref 3.5–5.2)
SODIUM SERPL-SCNC: 139 MMOL/L (ref 136–145)
TROPONIN T DELTA: -5 NG/L
TROPONIN T SERPL HS-MCNC: 30 NG/L

## 2024-01-08 PROCEDURE — 84484 ASSAY OF TROPONIN QUANT: CPT | Performed by: STUDENT IN AN ORGANIZED HEALTH CARE EDUCATION/TRAINING PROGRAM

## 2024-01-08 PROCEDURE — 99239 HOSP IP/OBS DSCHRG MGMT >30: CPT | Performed by: INTERNAL MEDICINE

## 2024-01-08 PROCEDURE — 80048 BASIC METABOLIC PNL TOTAL CA: CPT | Performed by: STUDENT IN AN ORGANIZED HEALTH CARE EDUCATION/TRAINING PROGRAM

## 2024-01-08 PROCEDURE — 25010000002 ENOXAPARIN PER 10 MG: Performed by: STUDENT IN AN ORGANIZED HEALTH CARE EDUCATION/TRAINING PROGRAM

## 2024-01-08 PROCEDURE — 83735 ASSAY OF MAGNESIUM: CPT | Performed by: STUDENT IN AN ORGANIZED HEALTH CARE EDUCATION/TRAINING PROGRAM

## 2024-01-08 RX ORDER — SODIUM CHLORIDE 0.9 % (FLUSH) 0.9 %
10 SYRINGE (ML) INJECTION AS NEEDED
Status: DISCONTINUED | OUTPATIENT
Start: 2024-01-08 | End: 2024-01-08 | Stop reason: HOSPADM

## 2024-01-08 RX ORDER — AMOXICILLIN 250 MG
2 CAPSULE ORAL 2 TIMES DAILY
Status: DISCONTINUED | OUTPATIENT
Start: 2024-01-08 | End: 2024-01-08 | Stop reason: HOSPADM

## 2024-01-08 RX ORDER — NITROGLYCERIN 0.4 MG/1
0.4 TABLET SUBLINGUAL
Status: DISCONTINUED | OUTPATIENT
Start: 2024-01-08 | End: 2024-01-08 | Stop reason: HOSPADM

## 2024-01-08 RX ORDER — CHOLECALCIFEROL (VITAMIN D3) 125 MCG
5 CAPSULE ORAL NIGHTLY PRN
Status: DISCONTINUED | OUTPATIENT
Start: 2024-01-08 | End: 2024-01-08 | Stop reason: HOSPADM

## 2024-01-08 RX ORDER — BISACODYL 5 MG/1
5 TABLET, DELAYED RELEASE ORAL DAILY PRN
Status: DISCONTINUED | OUTPATIENT
Start: 2024-01-08 | End: 2024-01-08 | Stop reason: HOSPADM

## 2024-01-08 RX ORDER — ALUMINA, MAGNESIA, AND SIMETHICONE 2400; 2400; 240 MG/30ML; MG/30ML; MG/30ML
15 SUSPENSION ORAL EVERY 6 HOURS PRN
Status: DISCONTINUED | OUTPATIENT
Start: 2024-01-08 | End: 2024-01-08 | Stop reason: HOSPADM

## 2024-01-08 RX ORDER — VENLAFAXINE HYDROCHLORIDE 75 MG/1
75 CAPSULE, EXTENDED RELEASE ORAL DAILY
Status: DISCONTINUED | OUTPATIENT
Start: 2024-01-08 | End: 2024-01-08 | Stop reason: HOSPADM

## 2024-01-08 RX ORDER — BISACODYL 10 MG
10 SUPPOSITORY, RECTAL RECTAL DAILY PRN
Status: DISCONTINUED | OUTPATIENT
Start: 2024-01-08 | End: 2024-01-08 | Stop reason: HOSPADM

## 2024-01-08 RX ORDER — POLYETHYLENE GLYCOL 3350 17 G/17G
17 POWDER, FOR SOLUTION ORAL DAILY PRN
Status: DISCONTINUED | OUTPATIENT
Start: 2024-01-08 | End: 2024-01-08 | Stop reason: HOSPADM

## 2024-01-08 RX ORDER — SODIUM CHLORIDE 9 MG/ML
100 INJECTION, SOLUTION INTRAVENOUS CONTINUOUS
Status: DISCONTINUED | OUTPATIENT
Start: 2024-01-08 | End: 2024-01-08 | Stop reason: HOSPADM

## 2024-01-08 RX ORDER — ENOXAPARIN SODIUM 100 MG/ML
40 INJECTION SUBCUTANEOUS DAILY
Status: DISCONTINUED | OUTPATIENT
Start: 2024-01-08 | End: 2024-01-08 | Stop reason: HOSPADM

## 2024-01-08 RX ORDER — SODIUM CHLORIDE 9 MG/ML
40 INJECTION, SOLUTION INTRAVENOUS AS NEEDED
Status: DISCONTINUED | OUTPATIENT
Start: 2024-01-08 | End: 2024-01-08 | Stop reason: HOSPADM

## 2024-01-08 RX ORDER — ACETAMINOPHEN 325 MG/1
650 TABLET ORAL EVERY 6 HOURS PRN
Status: DISCONTINUED | OUTPATIENT
Start: 2024-01-08 | End: 2024-01-08 | Stop reason: HOSPADM

## 2024-01-08 RX ORDER — SODIUM CHLORIDE 0.9 % (FLUSH) 0.9 %
10 SYRINGE (ML) INJECTION EVERY 12 HOURS SCHEDULED
Status: DISCONTINUED | OUTPATIENT
Start: 2024-01-08 | End: 2024-01-08 | Stop reason: HOSPADM

## 2024-01-08 RX ADMIN — ENOXAPARIN SODIUM 40 MG: 100 INJECTION SUBCUTANEOUS at 08:59

## 2024-01-08 RX ADMIN — VENLAFAXINE HYDROCHLORIDE 75 MG: 75 CAPSULE, EXTENDED RELEASE ORAL at 09:22

## 2024-01-08 NOTE — CONSULTS
[facility]   Cardiology Consult Note    Patient Name: Daniele Bowers  : 1980  MRN: [id number]  Primary Care Physician:  Penelope An APRN  Referring Physician: No ref. provider found  Date of admission: 2024    Subjective   Subjective     Reason for Consult/ Chief Complaint: Palpitations, dizziness    HPI:  Daniele Bowers is a 43 y.o. male with remote history of atrial fibrillation 7 years ago.  He was put on sotalol and had been on sotalol for 6 years before I saw him initially back in January of last year.  At that time we stopped the sotalol.  It had been about 1 year when he noted yesterday he played basketball and had dizziness.  He felt his heart rate up but thought it was due to playing basketball.  He had propafenone as needed at home but did not realize he was in atrial fibrillation so he did not take it.  When he came into the hospital his EKG showed atrial fibrillation with a heart rate of 150 bpm.  His thyroid was normal along with 2 sets of cardiac enzymes and chest x-ray.    Review of Systems   All systems were reviewed and negative except for: Dizziness, palpitation    Personal History     Past Medical History:   Diagnosis Date    Allergies     Ankle pain, right     Anxiety     Atrial fibrillation     GERD (gastroesophageal reflux disease)     Hypertension     Paroxysmal atrial fibrillation 2022        Past medical history reviewed      Family History: family history includes Diabetes in his father and mother; Heart disease in an other family member. Otherwise pertinent FHx was reviewed and not pertinent to current issue.    Social History:  reports that he has never smoked. He has never used smokeless tobacco. He reports that he does not drink alcohol and does not use drugs.    Home Medications:  levocetirizine, losartan, omeprazole, propafenone, and venlafaxine XR    Allergies:  No Known Allergies    Objective    Objective     Vitals:   Temp:  [98.1 °F (36.7 °C)-98.7  °F (37.1 °C)] 98.7 °F (37.1 °C)  Heart Rate:  [] 75  Resp:  [16-18] 18  BP: (121-141)/() 121/81      Physical Exam:   Constitutional: Awake, alert, No acute distress    Eyes: PERRLA, sclerae anicteric, no conjunctival injection   HENT: NCAT, mucous membranes moist   Neck: Supple, no thyromegaly, no lymphadenopathy, trachea midline   Respiratory: Clear to auscultation bilaterally, nonlabored respirations    Cardiovascular: RRR, no murmurs, rubs, or gallops, palpable pedal pulses bilaterally   Gastrointestinal: Positive bowel sounds, soft, nontender, nondistended   Musculoskeletal: No bilateral ankle edema, no clubbing or cyanosis to extremities   Psychiatric: Appropriate affect, cooperative   Neurologic: Oriented x 3, strength symmetric in all extremities, Cranial Nerves grossly intact to confrontation, speech clear   Skin: No rashes     Result Review    Result Review:  I have personally reviewed the results from the time of this admission to 1/8/2024 09:38 EST and agree with these findings:  [x]  Laboratory  []  Microbiology  [x]  Radiology  [x]  EKG/Telemetry   [x]  Cardiology/Vascular   []  Pathology  [x]  Old records  []  Other:  Most notable findings include:     CMP          1/7/2024    21:46 1/8/2024    06:43   CMP   Glucose 141  99    BUN 11  13    Creatinine 1.44  1.10    EGFR 62.2  86.0    Sodium 139  139    Potassium 3.6  4.1    Chloride 100  105    Calcium 10.3  9.3    Total Protein 8.4     Albumin 4.8     Globulin 3.6     Total Bilirubin 0.4     Alkaline Phosphatase 77     AST (SGOT) 29     ALT (SGPT) 28     Albumin/Globulin Ratio 1.3     BUN/Creatinine Ratio 7.6  11.8    Anion Gap 19.2  10.0       CBC          1/7/2024    21:46   CBC   WBC 7.50    RBC 5.19    Hemoglobin 15.5    Hematocrit 44.3    MCV 85.4    MCH 29.9    MCHC 35.0    RDW 11.9    Platelets 402       Lab Results   Component Value Date    TROPONINT 25 (H) 01/08/2024         Assessment & Plan   Assessment / Plan     Brief Patient  Summary:  Daniele Bowers is a 43 y.o. male who has a history of remote lone episode of atrial fibrillation back 7 years ago.  He was on sotalol for 7 years but after I saw him initially back in January 2023 we decided to stop the sotalol and give him as needed propafenone and watch to see if he has any further episodes.  Yesterday he was playing basketball and noted that he was dizzy and his heart rate was up.  He did not recognize it as atrial fibrillation so he did not take the propafenone.    Active Hospital Problems:  Active Hospital Problems    Diagnosis     **Rapid atrial fibrillation        Assessment:  1.  Paroxysmal atrial fibrillation  2.  Dizziness    Plan:   1.  Patient is spontaneously converted back into normal sinus rhythm.  2.  Patient can be discharged from a cardiac standpoint.  Of note he did not recognize his symptoms as atrial fibrillation and his Apple Watch did not pick it up.  Therefore he did not take any of the as needed propafenone.  3.  We will set the patient up with an electrophysiologist just to discuss possible ablation versus just continuing as needed propafenone.  His [id number] score is very low and I do not think he needs an anticoagulant.    Electronically signed by Tony Leon MD, 01/08/24, 9:38 AM EST.

## 2024-01-08 NOTE — OUTREACH NOTE
Prep Survey      Flowsheet Row Responses   [facility] facility patient discharged from? [facility]   Is LACE score < 7 ? Yes   Eligibility [facility]   Date of Admission 01/07/24   Date of Discharge 01/08/24   Discharge Disposition Home or Self Care   Discharge diagnosis Chronic paroxysmal atrial fibrillation acutely with RVR   Does the patient have one of the following disease processes/diagnoses(primary or secondary)? Other   Does the patient have Home health ordered? No   Is there a DME ordered? No   Prep survey completed? Yes            Samara MITCHELL - Registered Nurse

## 2024-01-08 NOTE — H&P
[facility]   HOSPITALIST HISTORY AND PHYSICAL  Date: 2024   Patient Name: Daniele Bowers  : 1980  MRN: [id number]  Primary Care Physician:  Penelope An APRN  Date of admission: 2024    Subjective   Subjective     Chief Complaint: Near syncope, fast heart rate    HPI:    Daniele Bowers is a 43 y.o. male past medical history of A-fib status post cardioversion no longer on antiarrhythmic, anxiety, hypertension presents to the ER due to new onset fast heart rate and feel like he is going to pass out.  Patient was playing bass well earlier tonight like he normally does when he felt uneasy and unsteady on his feet.  He felt like he needed to sit down and felt his heart racing.  He felt this was related to him having overexerted himself playing basketball and went home.  Upon arriving home he still felt uneasy and felt like he was going to pass out at which point he felt it was best to come into the ER for evaluation    Upon arrival he was found to be in rapid atrial fibrillation with rates up into the 160s.  Lab workup was unremarkable including a negative TSH and free T4.  Troponin was mildly elevated at 25.  Chest x-ray was unremarkable.  Patient was given Zofran Ativan Pepcid and 3 Cardizem boluses with persistent atrial fibrillation.  Hospitalist was then contacted for admission after initiation of Cardizem drip      Personal History     Past Medical History:  Past Medical History:   Diagnosis Date    Allergies     Ankle pain, right     Anxiety     Atrial fibrillation     GERD (gastroesophageal reflux disease)     Hypertension     Paroxysmal atrial fibrillation 2022         Past Surgical History:  Past Surgical History:   Procedure Laterality Date    APPENDECTOMY           Family History:   Reviewed and noncontributory except as mentioned in HPI    Social History:   Social Determinants of Health     Tobacco Use: Low Risk  (2024)    Patient History     Smoking Tobacco Use: Never      Smokeless Tobacco Use: Never     Passive Exposure: Not on file   Alcohol Use: Not on file   Financial Resource Strain: Not on file   Food Insecurity: Not on file   Transportation Needs: Not on file   Physical Activity: Not on file   Stress: Not on file   Social Connections: Unknown (10/8/2023)    Family and Community Support     Help with Day-to-Day Activities: Not on file     Lonely or Isolated: Not on file   Interpersonal Safety: Unknown (10/8/2023)    Abuse Screen     Unsafe at Home or Work/School: Not on file     Feels Threatened by Someone?: Not on file     Does Anyone Keep You from Contacting Others or Doint Things Outside the Home?: Not on file     Physical Sign of Abuse Present: Not on file   Depression: Not at risk (11/1/2023)    PHQ-2     PHQ-2 Score: 0   Housing Stability: Unknown (10/8/2023)    Housing Stability     Current Living Arrangements: Not on file     Potentially Unsafe Housing Conditions: Not on file   Utilities: Not on file   Health Literacy: Unknown (10/8/2023)    Education     Help with school or training?: Not on file     Preferred Language: Not on file   Employment: Unknown (10/8/2023)    Employment     Do you want help finding or keeping work or a job?: Not on file   Disabilities: Unknown (10/8/2023)    Disabilities     Concentrating, Remembering, or Making Decisions Difficulty: Not on file     Doing Errands Independently Difficulty: Not on file         Home Medications:  levocetirizine, losartan, omeprazole, propafenone, and venlafaxine XR    Allergies:  No Known Allergies    Review of Systems   All systems were reviewed and negative except for: Near syncope    Objective   Objective     Vitals:   Temp:  [98.1 °F (36.7 °C)] 98.1 °F (36.7 °C)  Heart Rate:  [105-162] 128  Resp:  [16] 16  BP: (131-141)/() 131/96    Physical Exam    Constitutional: Awake, alert, no acute distress   Eyes: Pupils equal, sclerae anicteric, no conjunctival injection   HENT: NCAT, mucous membranes  moist   Neck: Supple, no thyromegaly, no lymphadenopathy, trachea midline   Respiratory: Clear to auscultation bilaterally, nonlabored respirations    Cardiovascular: Tachycardic with regular rhythm, no murmurs, rubs, or gallops, palpable pedal pulses bilaterally   Gastrointestinal: Positive bowel sounds, soft, nontender, nondistended   Musculoskeletal: No bilateral ankle edema, no clubbing or cyanosis to extremities   Psychiatric: Appropriate affect, cooperative   Neurologic: Oriented x 3, strength symmetric in all extremities, Cranial Nerves grossly intact to confrontation, speech clear   Skin: No rashes     Result Review    Result Review:  I have personally reviewed the results from the time of this admission to 1/7/2024 23:51 EST and agree with these findings:  [x]  Laboratory  [x]  Microbiology  [x]  Radiology  [x]  EKG/Telemetry   [x]  Cardiology/Vascular   []  Pathology  [x]  Old records  []  Other:      Assessment & Plan   Assessment / Plan     Assessment/Plan:   Rapid atrial fibrillation  Prerenal azotemia  Elevated troponin likely type II MI due to demand ischemia due to above  Essential hypertension  Anxiety    Plan  - Admit to hospitalist service  - Continue Cardizem drip.  We will also start hydration given he probably had some volume loss from exerting self playing basketball.  TSH within normal limits.  Do not have high suspicion for any structural heart disease at this time however we will get cardiology input chronic echo as inpatient is warranted.  Will keep n.p.o. at midnight in case cardioversion is planned  - [id number] equals 1, patient is not a candidate for anticoagulation at this time  - Hold home ARB for now given azotemia.  Restart once creatinine seems to return to baseline  - Repeat troponin in a.m. to rule out any significant ischemia.  - Clarify other chronic anxiety med dosing and resume as warranted      Discussed with ER Physician and Nurse    All labs/imaging studies were  personally reviewed and findings are as noted above      DVT Prophylaxis: Lovenox    CODE STATUS:    Code Status (Patient has no pulse and is not breathing): CPR (Attempt to Resuscitate)  Medical Interventions (Patient has pulse or is breathing): Full Support      Admission Status:  I believe this patient meets inpatient status.    Electronically signed by Adithya De MD, 01/07/24, 11:51 PM EST.

## 2024-01-08 NOTE — DISCHARGE SUMMARY
[facility]         HOSPITALIST  DISCHARGE SUMMARY    Patient Name: Daniele Bowers  : 1980  MRN: [id number]    Date of Admission: 2024  Date of Discharge:  24  Primary Care Physician: Penelope An APRN    Consultants:  -Cardiology: Dr. Tony Leon    [facility] Problems:  Chronic paroxysmal atrial fibrillation acutely with RVR  Dizziness secondary to above  History of atrial fibrillation roughly 7 years ago  Obesity (BMI: 30.17)    Hospital Course     Hospital Course:  Daniele Bowers is a 43 y.o. male with chronic paroxysmal atrial fibrillation that presented to the ED with reports of rapid heart rate and dizziness while playing basketball and afterwards.  Of note, patient does have remote history of atrial fibrillation roughly 7 years ago, he was placed on sotalol and has been off school for 6 years until 2023 at which point sotalol was discontinued by cardiology and patient started on as needed propafenone.  Patient did not realize episode he was having while playing basketball shortly afterwards was atrial fibrillation so he did not take his home propafenone.  Eval in ED significant for EKG showing atrial fibrillation with heart rate of 115.  2 sets of cardiac enzymes and chest x-ray were normal.  Cardiology consulted.  Patient converted to sinus rhythm.  Cardiology evaluated patient.  Patient will discharge home and is to continue previously prescribed home medications with no changes.  Cardiology with plans to set patient up with an electrophysiologist to discuss possible ablation versus discontinuing as needed propafenone.  No anticoagulation recommended per cardiology at this time.  Patient hemodynamically stable and no additional inpatient evaluation or workup necessary at this time, patient will discharge home with outpatient follow-up.    DISCHARGE Follow Up Recommendations for labs and diagnostics:   -Follow-up with PCP in 3 to 5 days  -Follow-up with    Day of Discharge     Vital Signs:  Temp:  [98.1 °F (36.7 °C)-98.7 °F (37.1 °C)] 98.7 °F (37.1 °C)  Heart Rate:  [] 75  Resp:  [16-18] 18  BP: (121-141)/() 121/81  Physical Exam:   Gen: No acute distress, Conversant, Pleasant, sitting up in bed  Resp: CTAB, No w/r/r, No respiratory distress appreciated  Card: RRR, No m/r/g  Abd: Soft, Nontender, Nondistended, + bowel sounds    Discharge Details        Discharge Medications        Continue These Medications        Instructions Start Date   levocetirizine 5 MG tablet  Commonly known as: XYZAL   5 mg, Oral, Every Evening      losartan 50 MG tablet  Commonly known as: COZAAR   50 mg, Oral, Daily      omeprazole 40 MG capsule  Commonly known as: priLOSEC   40 mg, Oral, Daily      propafenone 300 MG tablet  Commonly known as: RYTHMOL   300 mg, Oral, Daily PRN      venlafaxine XR 37.5 MG 24 hr capsule  Commonly known as: EFFEXOR-XR   37.5 mg, Oral, Daily      venlafaxine XR 75 MG 24 hr capsule  Commonly known as: EFFEXOR-XR   75 mg, Oral, Daily               No Known Allergies    Discharge Disposition:  Home or Self Care    Diet:  Hospital:  Diet Order   Procedures   • Diet: Regular/House Diet; Texture: Regular Texture (IDDSI 7); Fluid Consistency: Thin (IDDSI 0)       Discharge Activity:   Activity Instructions       Activity as Tolerated              CODE STATUS:  Code Status and Medical Interventions:   Ordered at: 01/07/24 8537     Code Status (Patient has no pulse and is not breathing):    CPR (Attempt to Resuscitate)     Medical Interventions (Patient has pulse or is breathing):    Full Support       Future Appointments   Date Time Provider Department Center   5/1/2024 11:00 AM Penelope An APRN [facility] [place] [place] [facility]   11/22/2024  3:00 PM Tony Leon MD [facility] [place] [place] [facility]       Additional Instructions for the Follow-ups that You Need to Schedule       Discharge Follow-up with PCP   As directed       Currently Documented PCP:    Penelope An  APRN    PCP Phone Number:    [phone]     Follow Up Details: Follow-up in 3-5 days        Discharge Follow-up with Specified Provider: Dr. Tony Leon; 2 Weeks   As directed      To: Dr. Tony Leon   Follow Up: 2 Weeks                Pertinent  and/or Most Recent Results     RADIOLOGY:   XR Chest 1 View [[id number]] Naveen as Reviewed   Order Status: Completed Collected: 01/08/24 0006    Updated: 01/08/24 0009   Narrative:     PROCEDURE: XR CHEST 1 VW     COMPARISON: 12/27/2018.     INDICATIONS: Dysrhythmia(s).     FINDINGS: A single frontal (AP or PA upright portable) chest radiograph reveals borderline cardiac  enlargement and no acute infiltrate. No pneumothorax is seen.  Chronic calcified granulomatous  disease involves the chest.  External artifacts obscure detail.  There is slight pulmonary  hypoinflation.  There may be mild levoscoliosis of the upper thoracic spine.      Impression:     No acute cardiopulmonary disease is seen radiographically.              Please note that portions of this note were completed with a voice recognition program.     SAMEERA ELIZABETH JR, MD        Electronically Signed and Approved By: SAMEERA ELIZABETH JR, MD on 1/08/2024 at 0:06       LAB RESULTS:      Lab 01/07/24  2146   WBC 7.50   HEMOGLOBIN 15.5   HEMATOCRIT 44.3   PLATELETS 402   NEUTROS ABS 4.53   IMMATURE GRANS (ABS) 0.02   LYMPHS ABS 2.20   MONOS ABS 0.56   EOS ABS 0.11   MCV 85.4         Lab 01/08/24  0643 01/07/24  2146   SODIUM 139 139   POTASSIUM 4.1 3.6   CHLORIDE 105 100   CO2 24.0 19.8*   ANION GAP 10.0 19.2*   BUN 13 11   CREATININE 1.10 1.44*   EGFR 86.0 62.2   GLUCOSE 99 141*   CALCIUM 9.3 10.3   MAGNESIUM 2.1 1.9   TSH  --  2.800         Lab 01/07/24  2146   TOTAL PROTEIN 8.4   ALBUMIN 4.8   GLOBULIN 3.6   ALT (SGPT) 28   AST (SGOT) 29   BILIRUBIN 0.4   ALK PHOS 77         Lab 01/08/24  0830 01/08/24  0643 01/07/24  2146   HSTROP T 25* 30* 25*                 Brief Urine Lab Results       None           Microbiology Results (last 10 days)       ** No results found for the last 240 hours. **                Time spent on Discharge including face to face service:  31 minutes    Electronically signed by Douglas Pardo MD, 01/08/24, 10:36 AM EST.

## 2024-01-08 NOTE — PLAN OF CARE
Goal Outcome Evaluation:    Pt admitted overnight from ED for rapid atrial fibrillation, Pt arrived to floor on cardizem gtt, pt converted to NSR with HR in the 70's, MARGARET Munson notified via 5to1e message and cardizem gtt stopped, no apparent signs of distress noted, patient cooperative with care.

## 2024-01-08 NOTE — ED PROVIDER NOTES
Time: 9:55 PM EST  Date of encounter:  1/7/2024  Independent Historian/Clinical History and Information was obtained by:   Patient  Chief Complaint: Palpitations and weakness    History is limited by: N/A    History of Present Illness:  Patient is a 43 y.o. year old male who presents to the emergency department for evaluation of palpitations and weakness.  Patient notes he was fine basketball tonight approximately 1.5 hours prior to arrival.  Patient states that he became very lightheaded and very weak.  The patient began having palpitations which he describes as a fast irregular heart rate.  The patient states that he also felt very anxious.  This is continued to his arrival in the emergency room.  The patient denies any chest pain or chest pressure.  The patient denies any shortness of breath.  The patient's had no syncope.  Patient had no abdominal pain.  The patient's had no vomiting or diarrhea.  Patient does note that he has a history of atrial fibrillation but has had no problems since he was 35 years old which is 8 years ago.  Patient does note that he has a history of hypertension.  The patient has no history of high cholesterol or diabetes.  The patient is not a smoker.  The patient uses no illegal drugs or drinking.  Patient denies any history of DVT or pulmonary embolism.  The patient has no unilateral calf pain or leg swelling.  The patient does note that he was on sotalol for about 8 years.  His cardiologist switched and the summer and DC'd the sotalol.    HPI    Patient Care Team  Primary Care Provider: Penelope An APRN    Past Medical History:     No Known Allergies  Past Medical History:   Diagnosis Date    Allergies     Ankle pain, right     Anxiety     Atrial fibrillation     GERD (gastroesophageal reflux disease)     Hypertension     Paroxysmal atrial fibrillation 01/25/2022     Past Surgical History:   Procedure Laterality Date    APPENDECTOMY       Family History   Problem Relation Age of  Onset    Diabetes Mother     Diabetes Father     Heart disease Other        Home Medications:  Prior to Admission medications    Medication Sig Start Date End Date Taking? Authorizing Provider   levocetirizine (XYZAL) 5 MG tablet Take 1 tablet by mouth Every Evening.    Provider, MD Niall   losartan (COZAAR) 50 MG tablet Take 1 tablet by mouth Daily. 11/1/23   Penelope An APRN   omeprazole (priLOSEC) 40 MG capsule Take 1 capsule by mouth Daily. 11/1/23   Penelope An APRN   propafenone (RYTHMOL) 300 MG tablet Take 1 tablet by mouth Daily As Needed (palpitations.  May repeat one hour later if still palpitations.). 1/31/23   Tony Leon MD   venlafaxine XR (EFFEXOR-XR) 37.5 MG 24 hr capsule TAKE 1 CAPSULE BY MOUTH DAILY 10/17/23   Penelope An APRN   venlafaxine XR (EFFEXOR-XR) 75 MG 24 hr capsule Take 1 capsule by mouth Daily. 11/1/23   Penelope An APRN        Social History:   Social History     Tobacco Use    Smoking status: Never    Smokeless tobacco: Never   Vaping Use    Vaping Use: Never used   Substance Use Topics    Alcohol use: Never    Drug use: Never         Review of Systems:  Review of Systems   Constitutional:  Positive for fatigue. Negative for chills, diaphoresis and fever.   HENT:  Negative for congestion, postnasal drip, rhinorrhea and sore throat.    Eyes:  Negative for photophobia.   Respiratory:  Negative for cough, chest tightness and shortness of breath.    Cardiovascular:  Positive for palpitations. Negative for chest pain and leg swelling.   Gastrointestinal:  Negative for abdominal pain, diarrhea, nausea and vomiting.   Genitourinary:  Negative for difficulty urinating, dysuria, flank pain, frequency, hematuria and urgency.   Musculoskeletal:  Negative for neck pain and neck stiffness.   Skin:  Negative for pallor and rash.   Neurological:  Positive for weakness and light-headedness. Negative for dizziness, syncope, numbness and headaches.  "  Hematological:  Negative for adenopathy. Does not bruise/bleed easily.   Psychiatric/Behavioral:  The patient is nervous/anxious.         Physical Exam:  /81 (BP Location: Right arm, Patient Position: Lying)   Pulse 75   Temp 98.7 °F (37.1 °C) (Oral)   Resp 18   Ht 182.9 cm (72\")   Wt 101 kg (222 lb 7.1 oz)   SpO2 99%   BMI 30.17 kg/m²     Physical Exam  Vitals and nursing note reviewed.   Constitutional:       General: He is not in acute distress.     Appearance: Normal appearance. He is not ill-appearing, toxic-appearing or diaphoretic.   HENT:      Head: Normocephalic and atraumatic.      Mouth/Throat:      Mouth: Mucous membranes are moist.   Eyes:      Pupils: Pupils are equal, round, and reactive to light.   Cardiovascular:      Rate and Rhythm: Normal rate. Tachycardia present. Rhythm irregular.      Pulses: Normal pulses.           Carotid pulses are 2+ on the right side and 2+ on the left side.       Radial pulses are 2+ on the right side and 2+ on the left side.        Femoral pulses are 2+ on the right side and 2+ on the left side.       Popliteal pulses are 2+ on the right side and 2+ on the left side.        Dorsalis pedis pulses are 2+ on the right side and 2+ on the left side.        Posterior tibial pulses are 2+ on the right side and 2+ on the left side.      Heart sounds: Normal heart sounds. No murmur heard.  Pulmonary:      Effort: Pulmonary effort is normal. No accessory muscle usage, respiratory distress or retractions.      Breath sounds: Normal breath sounds. No wheezing, rhonchi or rales.   Abdominal:      General: Abdomen is flat. There is no distension.      Palpations: Abdomen is soft. There is no mass or pulsatile mass.      Tenderness: There is no abdominal tenderness. There is no right CVA tenderness, left CVA tenderness, guarding or rebound.      Comments: No rigidity   Musculoskeletal:         General: No swelling, tenderness or deformity.      Cervical back: Neck " supple. No tenderness.      Right lower leg: No edema.      Left lower leg: No edema.   Skin:     General: Skin is warm and dry.      Capillary Refill: Capillary refill takes less than 2 seconds.      Coloration: Skin is not jaundiced or pale.      Findings: No erythema.   Neurological:      General: No focal deficit present.      Mental Status: He is alert and oriented to person, place, and time. Mental status is at baseline.      Cranial Nerves: Cranial nerves 2-12 are intact. No cranial nerve deficit.      Sensory: Sensation is intact. No sensory deficit.      Motor: Motor function is intact. No weakness or pronator drift.      Coordination: Coordination is intact. Coordination normal.   Psychiatric:         Attention and Perception: Attention and perception normal.         Mood and Affect: Mood is anxious.         Speech: Speech normal.         Behavior: Behavior normal.         Cognition and Memory: Cognition and memory normal.         Judgment: Judgment normal.                  Procedures:  Procedures      Medical Decision Making:      Comorbidities that affect care:    Allergies, GERD, atrial fibrillation, anxiety, hypertension, paroxysmal atrial fibrillation    External Notes reviewed:    None      The following orders were placed and all results were independently analyzed by me:  Orders Placed This Encounter   Procedures    XR Chest 1 View    [place] Draw    Comprehensive Metabolic Panel    Magnesium    Single High Sensitivity Troponin T    TSH    CBC Auto Differential    T4, Free    Basic Metabolic Panel    Magnesium    High Sensitivity Troponin T    High Sensitivity Troponin T 2Hr    Undress & Gown    Continuous Pulse Oximetry    Discontinue Telemetry    Discharge Follow-up with PCP    Discharge Follow-up with Specified Provider: Dr. Tony Leon; 2 Weeks    Activity as Tolerated    Diet: Regular/House Diet; Regular Texture (IDDSI 7); Thin (IDDSI 0)    ECG 12 Lead ED Triage Standing Order; Dysrhythmia     Inpatient Admission    Discharge patient    CBC & Differential    Green Top (Gel)    Lavender Top    Gold Top - SST    Light Blue Top       Medications Given in the Emergency Department:  Medications   dilTIAZem (CARDIZEM) injection 20 mg ( Intravenous Canceled Entry 1/8/24 0815)   ondansetron (ZOFRAN) injection 4 mg (4 mg Intravenous Given 1/7/24 2153)   LORazepam (ATIVAN) injection 1 mg (1 mg Intravenous Given 1/7/24 2157)   dilTIAZem (CARDIZEM) bolus from bag 1 mg/mL 10 mg (10 mg Intravenous Bolus from Bag 1/7/24 2235)   famotidine (PEPCID) injection 20 mg (20 mg Intravenous Given 1/7/24 2223)   ondansetron (ZOFRAN) injection 4 mg (4 mg Intravenous Given 1/7/24 2223)   dilTIAZem (CARDIZEM) bolus from bag 1 mg/mL 10 mg (10 mg Intravenous Bolus from Bag 1/7/24 2300)   dilTIAZem (CARDIZEM) bolus from bag 1 mg/mL 10 mg (10 mg Intravenous Bolus from Bag 1/7/24 2349)        ED Course:    ED Course as of 01/11/24 0257   Sun Jan 07, 2024 2146 EKG:    Rhythm: Atrial fibrillation with rapid ventricular response  Rate: 149  Intervals: Normal QT interval  T-wave:  Artifact obscures detail, mobile T wave inversion in III  ST Segment: Again, baseline artifact obscures detail, nonspecific ST depression V3, V4, V5, V6, nonspecific ST segment III    EKG Comparison: The patient's EKG is changed from EKG performed July 26, 2022 as the patient was in a normal sinus rhythm at that time.  The patient did have a normal KS interval on that EKG    Interpreted by me   [SD]      ED Course User Index  [SD] Tony Sawyer DO       Labs:    Lab Results (last 24 hours)       ** No results found for the last 24 hours. **             Imaging:    No Radiology Exams Resulted Within Past 24 Hours      Differential Diagnosis and Discussion:    Palpitations: Differential diagnosis includes but is not limited to anxiety, atrioventricular blocks, mitral valve disease, hypoxia, coronary artery disease, hypokalemia, anemia, fever, COPD, congestive  heart failure, pericarditis, Wilian-Parkinson-White syndrome, pulmonary embolism, SVT, atrial fibrillation, atrial flutter, sinus tachycardia, thyrotoxicosis, and pheochromocytoma.    All labs were reviewed and interpreted by me.  All X-rays impressions were independently interpreted by me.  EKG was interpreted by me.    MDM  Number of Diagnoses or Management Options  Atrial fibrillation with rapid ventricular response  Diagnosis management comments:   Patient presented with A-fib with rapid ventricular response.  At the time of admission, the patient's heart rate had improved to 101    The patient's CMP was reviewed and shows no abnormalities of critical concern.  Of note, the patient's sodium and potassium are acceptable.  The patient's liver enzymes are unremarkable.  The patient's renal function including creatinine is preserved.  The patient has a normal anion gap.    The patient's CBC was reviewed and shows no abnormalities of critical concern.  Of note, there is no anemia requiring a blood transfusion and the platelet count is acceptable    The patient's TSH and free T4 were normal.  I do not feel that the patient had thyrotoxicosis or thyroid storm and thus not the cause of the patient's symptoms    Patient's first high-sensitivity troponin was slightly elevated at 25        The patient was initially given 20 mg of Cardizem IV for the A-fib with rapid ventricular response.  The patient had a partial response to the Cardizem.  The patient's heart rate initially slowed down into the 130s but remained tachycardic.  However, the patient's heart rate went back up into the 150s.  Subsequently, the patient was rebolus with 10 and started on a drip.  The patient's drip was increased per protocol with a 10 mg bolus increasing the drip every 15 minutes.  At the time of admission, the patient is resting much more comfortably.  The patient continues to be in A-fib but his heart rate is controlled into the 110s.  The  patient is asymptomatic and resting comfortably.       Amount and/or Complexity of Data Reviewed  Clinical lab tests: reviewed  Tests in the radiology section of CPT®: reviewed  Tests in the medicine section of CPT®: reviewed  Discuss the patient with other providers: yes (23:26 EST  I discussed the case with the hospitalist.  We have discussed the patient's presenting symptoms, laboratory values, imaging and condition at the time of admission.  They will evaluate the patient in the emergency room and admit the patient to the hospital)    Critical Care  Total time providing critical care: 35 minutes (Total critical care time of.  Total critical care time documented does not include time spent on separately billed procedures for services of nurses or physician assistants.  I personally saw and examined the patient.  I have reviewed all diagnostic interpretations and treatment plans as written.  I was present for the key portions of any procedures performed and the inclusive time noted in any critical care statement.  Critical care time includes patient management by me, time spent at the patient bedside, time to review labs/ ABG's, imaging results, discussing patient care, documentation in the medical record and time spent with family or caregiver)             Social Determinants of Health:    Patient is independent, reliable, and has access to care.       Disposition and Care Coordination:    Admit:   Through independent evaluation of the patient's history, physical, and imperical data, the patient meets criteria for observation/admission to the hospital.        Final diagnoses:   Atrial fibrillation with rapid ventricular response        ED Disposition       ED Disposition   Decision to Admit    Condition   --    Comment   Level of Care: Telemetry [5]   Diagnosis: Rapid atrial fibrillation [[id number]]   Admitting Physician: DEVI LUGO [[id number]]   Attending Physician: DEVI LUGO [[id number]]   Certification: I Certify  That Inpatient Hospital Services Are Medically Necessary For Greater Than 2 Midnights                 This medical record created using voice recognition software.             Tony Sawyer DO  01/11/24 0251

## 2024-01-09 ENCOUNTER — TRANSITIONAL CARE MANAGEMENT TELEPHONE ENCOUNTER (OUTPATIENT)
Dept: CALL CENTER | Facility: HOSPITAL | Age: 44
End: 2024-01-09
Payer: COMMERCIAL

## 2024-01-09 ENCOUNTER — TELEPHONE (OUTPATIENT)
Dept: CARDIOLOGY | Facility: CLINIC | Age: 44
End: 2024-01-09
Payer: COMMERCIAL

## 2024-01-09 LAB
QT INTERVAL: 313 MS
QTC INTERVAL: 492 MS

## 2024-01-09 NOTE — TELEPHONE ENCOUNTER
Per Yadira, pt needs a hospital f/u sometime next week.  I called pt, left vm for pt to return call.    Should pt call back, please schedule a hospital f/u appt next week.  Per Yadira, it is okay to overbook her.

## 2024-01-09 NOTE — OUTREACH NOTE
Call Center TCM Note      Flowsheet Row Responses   [facility] patient discharged from? [facility]   Does the patient have one of the following disease processes/diagnoses(primary or secondary)? Other   TCM attempt successful? No   Unsuccessful attempts Attempt 1            Sondra Maurer RN    1/9/2024, 10:44 EST

## 2024-01-09 NOTE — OUTREACH NOTE
Call Center TCM Note      Flowsheet Row Responses   [facility] [place] patient discharged from? [facility]   Does the patient have one of the following disease processes/diagnoses(primary or secondary)? Other   TCM attempt successful? Yes   Call start time 1614   Call end time 1615   Discharge diagnosis Chronic paroxysmal atrial fibrillation acutely with RVR   Meds reviewed with patient/caregiver? Yes   Does the patient have all medications ordered at discharge? N/A   Is the patient taking all medications as directed (includes completed medication regime)? Yes   Comments HOSP DC FU appt 1/10/24 9 am   Does the patient have an appointment with their PCP within 7-14 days of discharge? Yes   Has home health visited the patient within 72 hours of discharge? N/A   Psychosocial issues? No   Did the patient receive a copy of their discharge instructions? Yes   Nursing interventions Reviewed instructions with patient   What is the patient's perception of their health status since discharge? Improving   Is the patient/caregiver able to teach back signs and symptoms related to disease process for when to call PCP? Yes   Is the patient/caregiver able to teach back signs and symptoms related to disease process for when to call 911? Yes   Is the patient/caregiver able to teach back the hierarchy of who to call/visit for symptoms/problems? PCP, Specialist, Home health nurse, Urgent Care, ED, 911 Yes   TCM call completed? Yes   Wrap up additional comments Pt reports doing ok. still tired and some dizziness at times and will discuss with his Dr tomorrow during appt.   Call end time 1615            Sondra Maurer RN    1/9/2024, 16:15 EST

## 2024-01-10 ENCOUNTER — OFFICE VISIT (OUTPATIENT)
Dept: FAMILY MEDICINE CLINIC | Facility: CLINIC | Age: 44
End: 2024-01-10
Payer: COMMERCIAL

## 2024-01-10 VITALS
SYSTOLIC BLOOD PRESSURE: 137 MMHG | DIASTOLIC BLOOD PRESSURE: 90 MMHG | HEIGHT: 72 IN | BODY MASS INDEX: 30.07 KG/M2 | HEART RATE: 76 BPM | WEIGHT: 222 LBS | OXYGEN SATURATION: 98 %

## 2024-01-10 DIAGNOSIS — Z09 ENCOUNTER FOR EXAMINATION FOLLOWING TREATMENT AT HOSPITAL: Primary | ICD-10-CM

## 2024-01-10 DIAGNOSIS — R53.83 OTHER FATIGUE: ICD-10-CM

## 2024-01-10 DIAGNOSIS — I48.0 PAROXYSMAL ATRIAL FIBRILLATION: Chronic | ICD-10-CM

## 2024-01-10 RX ORDER — SOTALOL HYDROCHLORIDE 80 MG/1
TABLET ORAL
Qty: 90 TABLET | Refills: 0 | Status: SHIPPED | OUTPATIENT
Start: 2024-01-10

## 2024-01-10 NOTE — PROGRESS NOTES
Transitional Care Follow Up Visit  Subjective     Daniele Bowers is a 43 y.o. male who presents for a transitional care management visit.    Within 48 business hours after discharge our office contacted him via telephone to coordinate his care and needs.      I reviewed and discussed the details of that call along with the discharge summary, hospital problems, inpatient lab results, inpatient diagnostic studies, and consultation reports with Daniele.     Current outpatient and discharge medications have been reconciled for the patient.  Reviewed by: Penelope An, ADALBERTO          1/8/2024     4:29 PM   Date of TCM Phone Call   [facility]   Date of Admission 1/7/2024   Date of Discharge 1/8/2024   Discharge Disposition Home or Self Care     Risk for Readmission (LACE) Score: 4 (1/8/2024  6:00 AM)      History of Present Illness   Atrial fibrillation, anxiety     Course During Hospital Stay:  Date of Admission: 1/7/2024  Date of Discharge:  01/08/24     Hospital Course:  Daniele Bowers is a 43 y.o. male with chronic paroxysmal atrial fibrillation that presented to the ED with reports of rapid heart rate and dizziness while playing basketball and afterwards.  Of note, patient does have remote history of atrial fibrillation roughly 7 years ago, he was placed on sotalol and has been off school for 6 years until January 2023 at which point sotalol was discontinued by cardiology and patient started on as needed propafenone.  Patient did not realize episode he was having while playing basketball shortly afterwards was atrial fibrillation so he did not take his home propafenone.  Eval in ED significant for EKG showing atrial fibrillation with heart rate of 115.  2 sets of cardiac enzymes and chest x-ray were normal.  Cardiology consulted.  Patient converted to sinus rhythm.  Cardiology evaluated patient.  Patient will discharge home and is to continue previously prescribed home medications with no changes.   Cardiology with plans to set patient up with an electrophysiologist to discuss possible ablation versus discontinuing as needed propafenone.  No anticoagulation recommended per cardiology at this time.  Patient hemodynamically stable and no additional inpatient evaluation or workup necessary at this time, patient will discharge home with outpatient follow-up.      The following portions of the patient's history were reviewed and updated as appropriate: allergies, current medications, past family history, past medical history, past social history, past surgical history, and problem list.    Review of Systems   Constitutional:  Positive for fatigue.       Objective   Physical Exam  Vitals reviewed.   Constitutional:       Appearance: Normal appearance. He is well-developed.   HENT:      Head: Normocephalic and atraumatic.      Right Ear: External ear normal.      Left Ear: External ear normal.      Mouth/Throat:      Pharynx: No oropharyngeal exudate.   Eyes:      Conjunctiva/sclera: Conjunctivae normal.      Pupils: Pupils are equal, round, and reactive to light.   Cardiovascular:      Rate and Rhythm: Normal rate and regular rhythm.      Pulses: Normal pulses.      Heart sounds: Normal heart sounds. No murmur heard.     No friction rub. No gallop.   Pulmonary:      Effort: Pulmonary effort is normal.      Breath sounds: Normal breath sounds. No wheezing or rhonchi.   Skin:     General: Skin is warm and dry.   Neurological:      Mental Status: He is alert and oriented to person, place, and time.      Cranial Nerves: No cranial nerve deficit.   Psychiatric:         Mood and Affect: Mood and affect normal.         Behavior: Behavior normal.         Thought Content: Thought content normal.         Judgment: Judgment normal.         Assessment & Plan   Problems Addressed this Visit          Cardiac and Vasculature    Paroxysmal atrial fibrillation (Chronic)    Relevant Medications    sotalol (Betapace) 80 MG tablet     Other Relevant Orders    Ambulatory Referral to Cardiology (Completed)     Other Visit Diagnoses       Encounter for examination following treatment at hospital    -  Primary    Other fatigue              Diagnoses         Codes Comments    Encounter for examination following treatment at hospital    -  Primary ICD-10-CM: Z09  ICD-9-CM: V67.9     Paroxysmal atrial fibrillation     ICD-10-CM: I48.0  ICD-9-CM: 427.31 Recommend restart aspirin and sotalol half tablet twice daily.,  Refer to electrophysiology neurologist    Other fatigue     ICD-10-CM: R53.83  ICD-9-CM: 780.79                      Time Spent: I spent 30 minutes caring for Daniele on this date of service. This time includes time spent by me in the following activities: preparing for the visit, reviewing tests, obtaining and/or reviewing a separately obtained history, performing a medically appropriate examination and/or evaluation, counseling and educating the patient/family/caregiver, ordering medications, tests, or procedures, documenting information in the medical record, and independently interpreting results and communicating that information with the patient/family/caregiver.

## 2024-01-11 ENCOUNTER — TELEPHONE (OUTPATIENT)
Dept: FAMILY MEDICINE CLINIC | Facility: CLINIC | Age: 44
End: 2024-01-11
Payer: COMMERCIAL

## 2024-01-11 NOTE — TELEPHONE ENCOUNTER
Caller: Daniele Bowers    Relationship: Self    Best call back number: [phone]     What is the best time to reach you: ANY     Who are you requesting to speak with (clinical staff, provider,  specific staff member): CLINICAL     What was the call regarding: PATIENT WANTED TO MAKE SURE PCP RECEIVED EMAIL OF LA PAPERWORK ON 01/11 PLEASE ADVISE

## 2024-01-12 ENCOUNTER — TELEPHONE (OUTPATIENT)
Dept: FAMILY MEDICINE CLINIC | Facility: CLINIC | Age: 44
End: 2024-01-12
Payer: COMMERCIAL

## 2024-01-12 NOTE — TELEPHONE ENCOUNTER
Patient came in inquiring about [facility] paperwork he emailed to you on Wednesday. He said you told him that paperwork would be completed that day. He was just coming in to first confirm that it was the right email address which was [email or web address] and then just wanted to know a roundabout date when that would be completed. I stated to patient it had not been uploaded in chart yet.

## 2024-01-26 ENCOUNTER — OFFICE VISIT (OUTPATIENT)
Dept: CARDIOLOGY | Facility: CLINIC | Age: 44
End: 2024-01-26
Payer: COMMERCIAL

## 2024-01-26 VITALS
HEART RATE: 58 BPM | WEIGHT: 231.6 LBS | HEIGHT: 72 IN | BODY MASS INDEX: 31.37 KG/M2 | SYSTOLIC BLOOD PRESSURE: 136 MMHG | DIASTOLIC BLOOD PRESSURE: 88 MMHG

## 2024-01-26 DIAGNOSIS — G47.39 SLEEP APNEA-LIKE BEHAVIOR: ICD-10-CM

## 2024-01-26 DIAGNOSIS — I48.0 PAROXYSMAL ATRIAL FIBRILLATION: Primary | ICD-10-CM

## 2024-01-26 DIAGNOSIS — E78.5 HYPERLIPIDEMIA, UNSPECIFIED HYPERLIPIDEMIA TYPE: ICD-10-CM

## 2024-01-26 DIAGNOSIS — I10 PRIMARY HYPERTENSION: ICD-10-CM

## 2024-01-26 NOTE — PROGRESS NOTES
Chief Complaint  Hospital Follow Up Visit (3 week follow up )    Subjective        History of Present Illness  Daniele Bowers presents to [facility] CARDIOLOGY for follow up.  Patient is a 43-year-old with past medical history outlined below, significant for paroxysmal atrial fibrillation, hypertension who presents for follow-up on recent hospitalization where he was found to be in atrial fibrillation with rapid ventricular rate.  Patient reports that he was playing basketball when he became dizzy and felt his heart racing and reported to the ER.  He did have pill in the pocket propafenone but was not sure if he was in atrial fibrillation and opted to go to the ER instead of taking it.  He has not had any further episodes.  His primary care doctor resumed his sotalol despite him being on propafenone as needed.  He has been taking the sotalol but has not needed any propafenone.  He spontaneously converted while he was in the hospital.  He notes no palpitations, chest pain, dyspnea, orthopnea, edema, dizziness, lightheadedness or syncope.      Past Medical History:   Diagnosis Date    Allergies     Ankle pain, right     Anxiety     Atrial fibrillation     GERD (gastroesophageal reflux disease)     Hypertension     Paroxysmal atrial fibrillation 01/25/2022       ALLERGY  No Known Allergies     Past Surgical History:   Procedure Laterality Date    APPENDECTOMY          Social History     Socioeconomic History    Marital status:    Tobacco Use    Smoking status: Never    Smokeless tobacco: Never   Vaping Use    Vaping Use: Never used   Substance and Sexual Activity    Alcohol use: Never    Drug use: Never    Sexual activity: Defer       Family History   Problem Relation Age of Onset    Diabetes Mother     Diabetes Father     Heart disease Other         Current Outpatient Medications on File Prior to Visit   Medication Sig    levocetirizine (XYZAL) 5 MG tablet Take 1 tablet by mouth Every  "Evening.    losartan (COZAAR) 50 MG tablet Take 1 tablet by mouth Daily.    omeprazole (priLOSEC) 40 MG capsule Take 1 capsule by mouth Daily.    propafenone (RYTHMOL) 300 MG tablet Take 1 tablet by mouth Daily As Needed (palpitations.  May repeat one hour later if still palpitations.).    venlafaxine XR (EFFEXOR-XR) 37.5 MG 24 hr capsule TAKE 1 CAPSULE BY MOUTH DAILY    venlafaxine XR (EFFEXOR-XR) 75 MG 24 hr capsule Take 1 capsule by mouth Daily.    [DISCONTINUED] sotalol (Betapace) 80 MG tablet Take 1/2 tablet BID     No current facility-administered medications on file prior to visit.       Objective   Vitals:    01/26/24 1315   BP: 136/88   BP Location: Right arm   Pulse: 58   Weight: 105 kg (231 lb 9.6 oz)   Height: 182.9 cm (72\")       Physical Exam  Constitutional:       General: He is awake. He is not in acute distress.     Appearance: Normal appearance.   HENT:      Head: Normocephalic.      Nose: Nose normal. No congestion.   Eyes:      Extraocular Movements: Extraocular movements intact.      Conjunctiva/sclera: Conjunctivae normal.      Pupils: Pupils are equal, round, and reactive to light.   Neck:      Thyroid: No thyromegaly.      Vascular: No JVD.   Cardiovascular:      Rate and Rhythm: Normal rate and regular rhythm.      Chest Wall: PMI is not displaced.      Pulses: Normal pulses.      Heart sounds: Normal heart sounds, S1 normal and S2 normal. No murmur heard.     No friction rub. No gallop. No S3 or S4 sounds.   Pulmonary:      Effort: Pulmonary effort is normal.      Breath sounds: Normal breath sounds. No wheezing, rhonchi or rales.   Abdominal:      General: Bowel sounds are normal.      Palpations: Abdomen is soft.      Tenderness: There is no abdominal tenderness.   Musculoskeletal:      Cervical back: No tenderness.      Right lower leg: No edema.      Left lower leg: No edema.   Lymphadenopathy:      Cervical: No cervical adenopathy.   Skin:     General: Skin is warm and dry.      " "Capillary Refill: Capillary refill takes less than 2 seconds.      Coloration: Skin is not cyanotic.      Findings: No petechiae or rash.      Nails: There is no clubbing.   Neurological:      Mental Status: He is alert.   Psychiatric:         Mood and Affect: Mood normal.         Behavior: Behavior is cooperative.           Result Review     The following data was reviewed by ADALBERTO Kirkland on 01/26/24.    No results found for: \"PROBNP\"  CMP          1/7/2024    21:46 1/8/2024    06:43   CMP   Glucose 141  99    BUN 11  13    Creatinine 1.44  1.10    EGFR 62.2  86.0    Sodium 139  139    Potassium 3.6  4.1    Chloride 100  105    Calcium 10.3  9.3    Total Protein 8.4     Albumin 4.8     Globulin 3.6     Total Bilirubin 0.4     Alkaline Phosphatase 77     AST (SGOT) 29     ALT (SGPT) 28     Albumin/Globulin Ratio 1.3     BUN/Creatinine Ratio 7.6  11.8    Anion Gap 19.2  10.0      CBC w/diff          1/7/2024    21:46   CBC w/Diff   WBC 7.50    RBC 5.19    Hemoglobin 15.5    Hematocrit 44.3    MCV 85.4    MCH 29.9    MCHC 35.0    RDW 11.9    Platelets 402    Neutrophil Rel % 60.3    Immature Granulocyte Rel % 0.3    Lymphocyte Rel % 29.3    Monocyte Rel % 7.5    Eosinophil Rel % 1.5    Basophil Rel % 1.1                    Procedures    Assessment & Plan  Diagnoses and all orders for this visit:    1. Paroxysmal atrial fibrillation (Primary)  -     Ambulatory Referral to Cardiac Electrophysiology  -     Ambulatory Referral to Sleep Medicine  -     Lipid Panel; Future    2. Primary hypertension    3. Hyperlipidemia, unspecified hyperlipidemia type    1.  After long discussion with the patient we decided to discontinue the sotalol and continue the pill in the pocket approach with propafenone 300 mg as needed atrial fibrillation.  He will be referred to EP for consideration of ablation.  We did discuss at length his [id number] score of 1 (hypertension) and there not being a need for anticoagulation due to " this.  Continue baby aspirin daily.  2.  Blood pressure is well-controlled on current regimen.  Continue the same.  3.  Cholesterol is not at goal.  Recommend heart healthy diet and regular exercise.  4.  Patient is mildly obese with daytime somnolence.  He will be referred to sleep medicine to rule out sleep apnea in the setting of atrial fibrillation.  Follow Up   No follow-ups on file.    Patient was given instructions and counseling regarding his condition or for health maintenance advice. Please see specific information pulled into the AVS if appropriate.     Yadira Gilbert, APRN  01/26/24  13:35 EST    Dictated Utilizing Dragon Dictation

## 2024-02-13 ENCOUNTER — TELEPHONE (OUTPATIENT)
Dept: CARDIOLOGY | Facility: CLINIC | Age: 44
End: 2024-02-13
Payer: COMMERCIAL

## 2024-02-22 ENCOUNTER — LAB (OUTPATIENT)
Dept: LAB | Facility: HOSPITAL | Age: 44
End: 2024-02-22
Payer: COMMERCIAL

## 2024-02-22 DIAGNOSIS — Z12.5 PROSTATE CANCER SCREENING: ICD-10-CM

## 2024-02-22 DIAGNOSIS — Z00.00 ANNUAL PHYSICAL EXAM: ICD-10-CM

## 2024-02-22 DIAGNOSIS — I48.0 PAROXYSMAL ATRIAL FIBRILLATION: ICD-10-CM

## 2024-02-22 LAB
ALBUMIN SERPL-MCNC: 4.2 G/DL (ref 3.5–5.2)
ALBUMIN/GLOB SERPL: 1.4 G/DL
ALP SERPL-CCNC: 68 U/L (ref 39–117)
ALT SERPL W P-5'-P-CCNC: 22 U/L (ref 1–41)
ANION GAP SERPL CALCULATED.3IONS-SCNC: 14.1 MMOL/L (ref 5–15)
AST SERPL-CCNC: 15 U/L (ref 1–40)
BASOPHILS # BLD AUTO: 0.06 10*3/MM3 (ref 0–0.2)
BASOPHILS NFR BLD AUTO: 1.3 % (ref 0–1.5)
BILIRUB SERPL-MCNC: 0.3 MG/DL (ref 0–1.2)
BUN SERPL-MCNC: 13 MG/DL (ref 6–20)
BUN/CREAT SERPL: 13.4 (ref 7–25)
CALCIUM SPEC-SCNC: 9.6 MG/DL (ref 8.6–10.5)
CHLORIDE SERPL-SCNC: 106 MMOL/L (ref 98–107)
CHOLEST SERPL-MCNC: 184 MG/DL (ref 0–200)
CO2 SERPL-SCNC: 22.9 MMOL/L (ref 22–29)
CREAT SERPL-MCNC: 0.97 MG/DL (ref 0.76–1.27)
DEPRECATED RDW RBC AUTO: 38.6 FL (ref 37–54)
EGFRCR SERPLBLD CKD-EPI 2021: 100 ML/MIN/1.73
EOSINOPHIL # BLD AUTO: 0.13 10*3/MM3 (ref 0–0.4)
EOSINOPHIL NFR BLD AUTO: 2.9 % (ref 0.3–6.2)
ERYTHROCYTE [DISTWIDTH] IN BLOOD BY AUTOMATED COUNT: 12.2 % (ref 12.3–15.4)
GLOBULIN UR ELPH-MCNC: 3 GM/DL
GLUCOSE SERPL-MCNC: 93 MG/DL (ref 65–99)
HCT VFR BLD AUTO: 44.5 % (ref 37.5–51)
HDLC SERPL-MCNC: 38 MG/DL (ref 40–60)
HGB BLD-MCNC: 14.8 G/DL (ref 13–17.7)
IMM GRANULOCYTES # BLD AUTO: 0.01 10*3/MM3 (ref 0–0.05)
IMM GRANULOCYTES NFR BLD AUTO: 0.2 % (ref 0–0.5)
LDLC SERPL CALC-MCNC: 125 MG/DL (ref 0–100)
LDLC/HDLC SERPL: 3.24 {RATIO}
LYMPHOCYTES # BLD AUTO: 1.37 10*3/MM3 (ref 0.7–3.1)
LYMPHOCYTES NFR BLD AUTO: 30.2 % (ref 19.6–45.3)
MCH RBC QN AUTO: 29 PG (ref 26.6–33)
MCHC RBC AUTO-ENTMCNC: 33.3 G/DL (ref 31.5–35.7)
MCV RBC AUTO: 87.3 FL (ref 79–97)
MONOCYTES # BLD AUTO: 0.39 10*3/MM3 (ref 0.1–0.9)
MONOCYTES NFR BLD AUTO: 8.6 % (ref 5–12)
NEUTROPHILS NFR BLD AUTO: 2.58 10*3/MM3 (ref 1.7–7)
NEUTROPHILS NFR BLD AUTO: 56.8 % (ref 42.7–76)
NRBC BLD AUTO-RTO: 0 /100 WBC (ref 0–0.2)
PLATELET # BLD AUTO: 375 10*3/MM3 (ref 140–450)
PMV BLD AUTO: 8.8 FL (ref 6–12)
POTASSIUM SERPL-SCNC: 4.4 MMOL/L (ref 3.5–5.2)
PROT SERPL-MCNC: 7.2 G/DL (ref 6–8.5)
PSA SERPL-MCNC: 0.6 NG/ML (ref 0–4)
RBC # BLD AUTO: 5.1 10*6/MM3 (ref 4.14–5.8)
SODIUM SERPL-SCNC: 143 MMOL/L (ref 136–145)
TRIGL SERPL-MCNC: 114 MG/DL (ref 0–150)
TSH SERPL DL<=0.05 MIU/L-ACNC: 1.37 UIU/ML (ref 0.27–4.2)
VLDLC SERPL-MCNC: 21 MG/DL (ref 5–40)
WBC NRBC COR # BLD AUTO: 4.54 10*3/MM3 (ref 3.4–10.8)

## 2024-02-22 PROCEDURE — 80050 GENERAL HEALTH PANEL: CPT

## 2024-02-22 PROCEDURE — 36415 COLL VENOUS BLD VENIPUNCTURE: CPT

## 2024-02-22 PROCEDURE — 80061 LIPID PANEL: CPT

## 2024-02-22 PROCEDURE — G0103 PSA SCREENING: HCPCS

## 2024-02-23 ENCOUNTER — OFFICE VISIT (OUTPATIENT)
Dept: CARDIOLOGY | Facility: CLINIC | Age: 44
End: 2024-02-23
Payer: COMMERCIAL

## 2024-02-23 VITALS
HEART RATE: 81 BPM | BODY MASS INDEX: 31.04 KG/M2 | SYSTOLIC BLOOD PRESSURE: 137 MMHG | WEIGHT: 229.2 LBS | HEIGHT: 72 IN | DIASTOLIC BLOOD PRESSURE: 94 MMHG

## 2024-02-23 DIAGNOSIS — R55 POSTURAL DIZZINESS WITH PRESYNCOPE: Primary | ICD-10-CM

## 2024-02-23 DIAGNOSIS — R42 POSTURAL DIZZINESS WITH PRESYNCOPE: Primary | ICD-10-CM

## 2024-02-23 PROCEDURE — 99214 OFFICE O/P EST MOD 30 MIN: CPT | Performed by: INTERNAL MEDICINE

## 2024-02-23 RX ORDER — MECLIZINE HYDROCHLORIDE 25 MG/1
25 TABLET ORAL 2 TIMES DAILY PRN
Qty: 60 TABLET | Refills: 3 | Status: SHIPPED | OUTPATIENT
Start: 2024-02-23

## 2024-02-23 NOTE — PROGRESS NOTES
Chief Complaint  Paroxysmal atrial fibrillation and Dizziness    Subjective        Daniele Bowers presents to [facility] CARDIOLOGY  History of present illness:    Patient had a single episode of atrial fibrillation about 8 years ago.  He was put on sotalol and had been on sotalol for about 7 years when I saw him the first time.  He had no further atrial fibrillation.  I talked to him at this time about going off the sotalol and putting him on propafenone as a pill in the pocket approach.  We did this and about 1 year after stopping the sotalol he had a second episode.  He felt dizzy and was not exactly sure he was in atrial fibrillation so he did not take the propafenone at that time.  He was playing basketball and noted before he started playing basketball he felt fine.  He came to the emergency department and was found to be in atrial fibrillation and spontaneously converted back to normal sinus rhythm.  He states since that episode in January of this year that he has been very anxious.  It has affected really his whole life.  He went to an urgent treatment center with dizziness yesterday and had an EKG and was shown to be in normal sinus rhythm.  He describes the dizziness is more when he gets up and moves around but also can occur just sitting still.  He notes a little bit of movement in the head but no obvious vertigo.      Past Medical History:   Diagnosis Date    Allergies     Ankle pain, right     Anxiety     Atrial fibrillation     GERD (gastroesophageal reflux disease)     Hypertension     Paroxysmal atrial fibrillation 01/25/2022         Past Surgical History:   Procedure Laterality Date    APPENDECTOMY            Social History     Socioeconomic History    Marital status:    Tobacco Use    Smoking status: Never     Passive exposure: Never    Smokeless tobacco: Never   Vaping Use    Vaping Use: Never used   Substance and Sexual Activity    Alcohol use: Never    Drug use: Never  "   Sexual activity: Defer         Family History   Problem Relation Age of Onset    Diabetes Mother     Diabetes Father     Heart disease Other           No Known Allergies         Current Outpatient Medications:     hydrOXYzine (ATARAX) 25 MG tablet, Take 1 tablet by mouth Every 8 (Eight) Hours As Needed for Itching., Disp: 15 tablet, Rfl: 0    levocetirizine (XYZAL) 5 MG tablet, Take 1 tablet by mouth Every Evening., Disp: , Rfl:     losartan (COZAAR) 50 MG tablet, Take 1 tablet by mouth Daily., Disp: 90 tablet, Rfl: 1    omeprazole (priLOSEC) 40 MG capsule, Take 1 capsule by mouth Daily., Disp: 90 capsule, Rfl: 1    propafenone (RYTHMOL) 300 MG tablet, Take 1 tablet by mouth Daily As Needed (palpitations.  May repeat one hour later if still palpitations.)., Disp: 10 tablet, Rfl: 3    venlafaxine XR (EFFEXOR-XR) 37.5 MG 24 hr capsule, TAKE 1 CAPSULE BY MOUTH DAILY, Disp: 90 capsule, Rfl: 1    venlafaxine XR (EFFEXOR-XR) 75 MG 24 hr capsule, Take 1 capsule by mouth Daily., Disp: 90 capsule, Rfl: 1    meclizine (ANTIVERT) 25 MG tablet, Take 1 tablet by mouth 2 (Two) Times a Day As Needed for Dizziness., Disp: 60 tablet, Rfl: 3    Sotalol HCl AF 80 MG tablet, Take 0.5 tablets by mouth 2 (Two) Times a Day., Disp: 180 tablet, Rfl: 3      ROS:  Cardiac review of systems is negative.    Objective     /94   Pulse 81   Ht 182.9 cm (72.01\")   Wt 104 kg (229 lb 3.2 oz)   BMI 31.08 kg/m²       General Appearance:   well developed  well nourished  HENT:   oropharynx moist  lips not cyanotic  Respiratory:  no respiratory distress  normal breath sounds  no rales  Cardiovascular:  no jugular venous distention  regular rhythm  S1 normal, S2 normal  no S3, no S4   no murmur  no rub, no thrill  No carotid bruit  pedal pulses normal  lower extremity edema: none    Musculoskeletal:  no clubbing of fingers.   normocephalic, head atraumatic  Skin:   warm, dry  Psychiatric:  judgement and insight appropriate  normal mood and " affect    ECHO:    STRESS:    CATH:  No results found for this or any previous visit.    BMP:     Glucose   Date Value Ref Range Status   02/22/2024 93 65 - 99 mg/dL Final     BUN   Date Value Ref Range Status   02/22/2024 13 6 - 20 mg/dL Final     Creatinine   Date Value Ref Range Status   02/22/2024 0.97 0.76 - 1.27 mg/dL Final     Sodium   Date Value Ref Range Status   02/22/2024 143 136 - 145 mmol/L Final     Potassium   Date Value Ref Range Status   02/22/2024 4.4 3.5 - 5.2 mmol/L Final     Chloride   Date Value Ref Range Status   02/22/2024 106 98 - 107 mmol/L Final     CO2   Date Value Ref Range Status   02/22/2024 22.9 22.0 - 29.0 mmol/L Final     Calcium   Date Value Ref Range Status   02/22/2024 9.6 8.6 - 10.5 mg/dL Final     BUN/Creatinine Ratio   Date Value Ref Range Status   02/22/2024 13.4 7.0 - 25.0 Final     Anion Gap   Date Value Ref Range Status   02/22/2024 14.1 5.0 - 15.0 mmol/L Final     eGFR   Date Value Ref Range Status   02/22/2024 100.0 >60.0 mL/min/1.73 Final     LIPIDS:  Total Cholesterol   Date Value Ref Range Status   02/22/2024 184 0 - 200 mg/dL Final     Triglycerides   Date Value Ref Range Status   02/22/2024 114 0 - 150 mg/dL Final     HDL Cholesterol   Date Value Ref Range Status   02/22/2024 38 (L) 40 - 60 mg/dL Final     LDL Cholesterol    Date Value Ref Range Status   02/22/2024 125 (H) 0 - 100 mg/dL Final     VLDL Cholesterol   Date Value Ref Range Status   02/22/2024 21 5 - 40 mg/dL Final     LDL/HDL Ratio   Date Value Ref Range Status   02/22/2024 3.24  Final         Procedures             ASSESSMENT:  Diagnoses and all orders for this visit:    1. Postural dizziness with presyncope (Primary)    Other orders  -     Sotalol HCl AF 80 MG tablet; Take 0.5 tablets by mouth 2 (Two) Times a Day.  Dispense: 180 tablet; Refill: 3  -     meclizine (ANTIVERT) 25 MG tablet; Take 1 tablet by mouth 2 (Two) Times a Day As Needed for Dizziness.  Dispense: 60 tablet; Refill: 3          PLAN:    1.  Not sure if this dizziness could possibly be an in her ear etiology.  He did have the second episode of A-fib back in January but it was very short episode and he spontaneously converted back into a normal sinus rhythm.  I did give him meclizine and he is going to try this over the weekend to see how he responds.  2.  Patient has an appointment to see the electrophysiologist on Tuesday.  With this second episode of A-fib his anxiety has been very severe and it has affected his entire life.  We discussed it and working to go back on the sotalol 40 mg p.o. twice daily.  He will discuss with the electrophysiologist either staying on this lifelong or possible ablation.  His [id number] score is 1.  3.  Blood pressure is borderline.  If it stays elevated we could go up some on the losartan.  4.  Patient was set up for a sleep study and he had it done but he has not sent it off yet.  5.  Reviewed patient's EKG from yesterday which showed normal sinus rhythm.      Return in about 6 weeks (around 4/5/2024).     Patient was given instructions and counseling regarding his condition or for health maintenance advice. Please see specific information pulled into the AVS if appropriate.         Tony Leon MD   2/23/2024  14:12 EST

## 2024-02-27 ENCOUNTER — OFFICE VISIT (OUTPATIENT)
Dept: FAMILY MEDICINE CLINIC | Facility: CLINIC | Age: 44
End: 2024-02-27
Payer: COMMERCIAL

## 2024-02-27 ENCOUNTER — OFFICE VISIT (OUTPATIENT)
Dept: CARDIOLOGY | Facility: CLINIC | Age: 44
End: 2024-02-27
Payer: COMMERCIAL

## 2024-02-27 VITALS
HEIGHT: 72 IN | WEIGHT: 227.4 LBS | HEART RATE: 65 BPM | TEMPERATURE: 97.9 F | DIASTOLIC BLOOD PRESSURE: 84 MMHG | SYSTOLIC BLOOD PRESSURE: 138 MMHG | OXYGEN SATURATION: 98 % | BODY MASS INDEX: 30.8 KG/M2

## 2024-02-27 VITALS
HEART RATE: 68 BPM | HEIGHT: 74 IN | SYSTOLIC BLOOD PRESSURE: 134 MMHG | BODY MASS INDEX: 28.23 KG/M2 | DIASTOLIC BLOOD PRESSURE: 90 MMHG | WEIGHT: 220 LBS | OXYGEN SATURATION: 98 %

## 2024-02-27 DIAGNOSIS — F41.9 ANXIETY: ICD-10-CM

## 2024-02-27 DIAGNOSIS — R42 DIZZINESS: ICD-10-CM

## 2024-02-27 DIAGNOSIS — I48.0 PAROXYSMAL ATRIAL FIBRILLATION: Primary | Chronic | ICD-10-CM

## 2024-02-27 DIAGNOSIS — F41.9 ANXIETY: Primary | ICD-10-CM

## 2024-02-27 DIAGNOSIS — I10 PRIMARY HYPERTENSION: Chronic | ICD-10-CM

## 2024-02-27 PROBLEM — I48.91 RAPID ATRIAL FIBRILLATION: Status: RESOLVED | Noted: 2024-01-07 | Resolved: 2024-02-27

## 2024-02-27 PROCEDURE — 99204 OFFICE O/P NEW MOD 45 MIN: CPT | Performed by: INTERNAL MEDICINE

## 2024-02-27 PROCEDURE — 99214 OFFICE O/P EST MOD 30 MIN: CPT | Performed by: NURSE PRACTITIONER

## 2024-02-27 RX ORDER — BUSPIRONE HYDROCHLORIDE 5 MG/1
5 TABLET ORAL 2 TIMES DAILY
Qty: 60 TABLET | Refills: 1 | Status: SHIPPED | OUTPATIENT
Start: 2024-02-27

## 2024-02-27 RX ORDER — ASPIRIN 81 MG/1
81 TABLET ORAL DAILY
COMMUNITY

## 2024-02-27 NOTE — PROGRESS NOTES
Cardiac Electrophysiology Outpatient Consult Note            [facility]    Consult Note     Daniele Bowers  [id number]  02/27/2024  [phone]     Primary Care Physician: Penelope An APRN    Referred By: Yadira Gilbert*    Subjective     Chief Complaint:   Diagnoses and all orders for this visit:    1. Paroxysmal atrial fibrillation (Primary)    2. Anxiety    3. Primary hypertension      Chief Complaint   Patient presents with    Atrial Fibrillation       History of Present Illness:   Daniele Bowers is a 43 y.o. male who presents to my electrophysiology clinic for evaluation of highly symptomatic recurrent episodes of atrial fibrillation.  His first episode was approximately 7 years ago.  It lasted several days.  He was on sotalol then for 7 years duration.  Recently this was held in attempt to see if he still required antiarrhythmic drug therapy.  This resulted in recurrence of atrial fibrillation which was of 4 to 5 hours duration.  He is now back on sotalol but does not like taking it because of risk of drug interaction and potential long-term side effects.      Past Medical History:   Past Medical History:   Diagnosis Date    Allergies     Ankle pain, right     Anxiety     GERD (gastroesophageal reflux disease)     Hypertension     Paroxysmal atrial fibrillation 01/25/2022       Past Surgical History:   Past Surgical History:   Procedure Laterality Date    APPENDECTOMY         Family History:   Family History   Problem Relation Age of Onset    Diabetes Mother     Diabetes Father     Heart disease Other        Social History:   Social History     Socioeconomic History    Marital status:    Tobacco Use    Smoking status: Never     Passive exposure: Never    Smokeless tobacco: Never   Vaping Use    Vaping Use: Never used   Substance and Sexual Activity    Alcohol use: Never    Drug use: Never    Sexual activity: Defer       Medications:  "    Current Outpatient Medications:     aspirin 81 MG EC tablet, Take 1 tablet by mouth Daily., Disp: , Rfl:     busPIRone (BUSPAR) 5 MG tablet, Take 1 tablet by mouth 2 (Two) Times a Day., Disp: 60 tablet, Rfl: 1    hydrOXYzine (ATARAX) 25 MG tablet, Take 1 tablet by mouth Every 8 (Eight) Hours As Needed for Itching., Disp: 15 tablet, Rfl: 0    levocetirizine (XYZAL) 5 MG tablet, Take 1 tablet by mouth Every Evening., Disp: , Rfl:     losartan (COZAAR) 50 MG tablet, Take 1 tablet by mouth Daily., Disp: 90 tablet, Rfl: 1    meclizine (ANTIVERT) 25 MG tablet, Take 1 tablet by mouth 2 (Two) Times a Day As Needed for Dizziness., Disp: 60 tablet, Rfl: 3    omeprazole (priLOSEC) 40 MG capsule, Take 1 capsule by mouth Daily., Disp: 90 capsule, Rfl: 1    Sotalol HCl AF 80 MG tablet, Take 0.5 tablets by mouth 2 (Two) Times a Day., Disp: 180 tablet, Rfl: 3    venlafaxine XR (EFFEXOR-XR) 37.5 MG 24 hr capsule, TAKE 1 CAPSULE BY MOUTH DAILY, Disp: 90 capsule, Rfl: 1    venlafaxine XR (EFFEXOR-XR) 75 MG 24 hr capsule, Take 1 capsule by mouth Daily., Disp: 90 capsule, Rfl: 1    Allergies:   No Known Allergies    Objective   Vital Signs:   Vitals:    02/27/24 1052   BP: 134/90   BP Location: Left arm   Patient Position: Sitting   Pulse: 68   SpO2: 98%   Weight: 99.8 kg (220 lb)   Height: 188 cm (74\")       PHYSICAL EXAM  General appearance: Awake, alert, cooperative  Head: Normocephalic, without obvious abnormality, atraumatic  Eyes: Conjunctivae/corneas clear, EOMs intact  Neck: no adenopathy, no carotid bruit, no JVD, and thyroid: not enlarged  Lungs: clear to auscultation bilaterally and no rhonchi or crackles\", ' symmetric  Heart: regular rate and rhythm, S1, S2 normal, no murmur, click, rub or gallop  Abdomen: Soft, non-tender, bowel sounds normal,  no organomegaly  Extremities: extremities normal, atraumatic, no cyanosis or edema  Skin: Skin color, turgor normal, no rashes or lesions  Neurologic: Grossly normal     Lab " "Results   Component Value Date    GLUCOSE 93 02/22/2024    CALCIUM 9.6 02/22/2024     02/22/2024    K 4.4 02/22/2024    CO2 22.9 02/22/2024     02/22/2024    BUN 13 02/22/2024    CREATININE 0.97 02/22/2024    EGFRIFNONA 89 01/26/2022    BCR 13.4 02/22/2024    ANIONGAP 14.1 02/22/2024     Lab Results   Component Value Date    WBC 4.54 02/22/2024    HGB 14.8 02/22/2024    HCT 44.5 02/22/2024    MCV 87.3 02/22/2024     02/22/2024     No results found for: \"INR\", \"PROTIME\"  Lab Results   Component Value Date    TSH 1.370 02/22/2024       Cardiac Testing:      I personally viewed and interpreted the patient's EKG/Telemetry/lab data    Procedures    Tobacco Cessation: N/A  Obstructive Sleep Apnea Screening: Completed    Advance Care Planning   ACP discussion was declined by the patient. Patient does not have an advance directive, declines further assistance.       Assessment & Plan    Diagnoses and all orders for this visit:    1. Paroxysmal atrial fibrillation (Primary)    2. Anxiety    3. Primary hypertension         Diagnosis Plan   1. Paroxysmal atrial fibrillation  Highly symptomatic recurrent paroxysmal atrial fibrillation.  Structurally normal heart by echocardiography and stress test some years ago.    Breakthrough episodes despite sotalol.  He does not want to keep taking sotalol.  He is here to discuss catheter ablation.  Reviewed catheter ablation in detail.  We also specifically discussed pulsed field ablation.    I reviewed the specific risk-benefit profile the procedure.  I quoted the patient a 1 to 2% chance of significant procedure complication including but not limited to bleeding at the groin, cardiac perforation, tamponade, stroke, myocardial infarction, death phrenic nerve injury, pulmonary vein stenosis, catheter entrapment of the mitral valve annulus, esophageal injury as well as fistula and other potential complications.    PFA ONLY  Rhythmia  General anesthesia  Preop CT  Do not " hold anticoagulation  Hold antiarrhythmic drug for 3 days          2. Anxiety  This seems to be well-controlled and well in hand.  Continue present therapy per his primary care team.      3. Primary hypertension  Essential hypertension constitutes a single risk factor for cardioembolic stroke.  We discussed that in the context of a [id number] score 1 and a young 43-year-old otherwise healthy gentleman it is reasonable after catheter ablation to discontinue anticoagulation 90 days post ablation.  This is his desire.  We will proceed in this fashion.        Body mass index is 28.25 kg/m².    I spent 49 minutes in consultation with this patient which included more than 65% of this time in direct face-to-face counseling, physical examination and discussion of my assessment and findings and this shared decision making with the patient.  The remainder of the time not spent face-to-face was performing one, some or all of the following actions: preparing to see the patient (e.g. reviewing tests, prior clinicians' notes, etc), ordering medications, tests or procedures, coordination of care, discussion of the plan with other healthcare providers, documenting clinical information in epic as well as independently interpreting results and communication of these results to the patient family and/or caregiver(s).  Please note that this explicitly excludes time spent on other separate billable services such as performing procedures or test interpretation, when applicable.    Follow Up:       Thank you for allowing me to participate in the care of your patient. Please to not hesitate to contact me with additional questions or concerns.      Drew Batres DO, FACC, RS  Cardiac Electrophysiologist  [facility] / [facility]

## 2024-02-27 NOTE — PROGRESS NOTES
Chief Complaint  Anxiety and Panic Attack    SUBJECTIVE  Daniele Bowers presents to [facility]    Patient complaining of increasing anxiety and panic attacks X 2 months.  Patient states symptoms started when he went into A fib 1/7/24.  Patient has been hyper focusing on what happened that day.  He is having anxiety in crowds.  Patient went to gym a few weeks ago and he end up having to leave when his heart rate started going up he panicked and had to leave.  Patient states he is not comfortable with his new cardiologist, Dr. Garrido, because his plan of care and management is so different from his previous cardiologist.  Patient was seen at the urgent care center 2/22/24 for same and was diagnosed with PTSD and was given Rx for Hydroxyzine.    History of Present Illness  Past Medical History:   Diagnosis Date    Allergies     Ankle pain, right     Anxiety     Atrial fibrillation     GERD (gastroesophageal reflux disease)     Hypertension     Paroxysmal atrial fibrillation 01/25/2022      Family History   Problem Relation Age of Onset    Diabetes Mother     Diabetes Father     Heart disease Other       Past Surgical History:   Procedure Laterality Date    APPENDECTOMY          Current Outpatient Medications:     aspirin 81 MG EC tablet, Take 1 tablet by mouth Daily., Disp: , Rfl:     hydrOXYzine (ATARAX) 25 MG tablet, Take 1 tablet by mouth Every 8 (Eight) Hours As Needed for Itching., Disp: 15 tablet, Rfl: 0    levocetirizine (XYZAL) 5 MG tablet, Take 1 tablet by mouth Every Evening., Disp: , Rfl:     losartan (COZAAR) 50 MG tablet, Take 1 tablet by mouth Daily., Disp: 90 tablet, Rfl: 1    meclizine (ANTIVERT) 25 MG tablet, Take 1 tablet by mouth 2 (Two) Times a Day As Needed for Dizziness., Disp: 60 tablet, Rfl: 3    omeprazole (priLOSEC) 40 MG capsule, Take 1 capsule by mouth Daily., Disp: 90 capsule, Rfl: 1    Sotalol HCl AF 80 MG tablet, Take 0.5 tablets by mouth 2 (Two) Times a  "Day., Disp: 180 tablet, Rfl: 3    venlafaxine XR (EFFEXOR-XR) 37.5 MG 24 hr capsule, TAKE 1 CAPSULE BY MOUTH DAILY, Disp: 90 capsule, Rfl: 1    venlafaxine XR (EFFEXOR-XR) 75 MG 24 hr capsule, Take 1 capsule by mouth Daily., Disp: 90 capsule, Rfl: 1    busPIRone (BUSPAR) 5 MG tablet, Take 1 tablet by mouth 2 (Two) Times a Day., Disp: 60 tablet, Rfl: 1    OBJECTIVE  Vital Signs:   /84 (BP Location: Left arm, Patient Position: Sitting, Cuff Size: Large Adult)   Pulse 65   Temp 97.9 °F (36.6 °C) (Oral)   Ht 182.9 cm (72\")   Wt 103 kg (227 lb 6.4 oz)   SpO2 98%   BMI 30.84 kg/m²    Estimated body mass index is 30.84 kg/m² as calculated from the following:    Height as of this encounter: 182.9 cm (72\").    Weight as of this encounter: 103 kg (227 lb 6.4 oz).     Wt Readings from Last 3 Encounters:   02/27/24 103 kg (227 lb 6.4 oz)   02/23/24 104 kg (229 lb 3.2 oz)   01/26/24 105 kg (231 lb 9.6 oz)     BP Readings from Last 3 Encounters:   02/27/24 138/84   02/23/24 137/94   02/22/24 155/92       Physical Exam  Vitals reviewed.   Constitutional:       Appearance: Normal appearance. He is well-developed.   HENT:      Head: Normocephalic and atraumatic.      Right Ear: External ear normal.      Left Ear: External ear normal.      Mouth/Throat:      Pharynx: No oropharyngeal exudate.   Eyes:      Conjunctiva/sclera: Conjunctivae normal.      Pupils: Pupils are equal, round, and reactive to light.   Neck:      Vascular: No carotid bruit.   Cardiovascular:      Rate and Rhythm: Normal rate and regular rhythm.      Pulses: Normal pulses.      Heart sounds: Normal heart sounds. No murmur heard.     No friction rub. No gallop.   Pulmonary:      Effort: Pulmonary effort is normal.      Breath sounds: Normal breath sounds. No wheezing or rhonchi.   Skin:     General: Skin is warm and dry.   Neurological:      Mental Status: He is alert and oriented to person, place, and time.      Cranial Nerves: No cranial nerve " deficit.   Psychiatric:         Mood and Affect: Mood and affect normal.         Behavior: Behavior normal.         Thought Content: Thought content normal.         Judgment: Judgment normal.          Result Review      Reviewed labs dated 2/22/2024 with patient    The above data has been reviewed by ADALBERTO Salazar 02/27/2024 09:31 EST.          Patient Care Team:  Penelope An APRN as PCP - General (Family Medicine)  Tony Leon MD as Consulting Physician (Cardiology)            ASSESSMENT & PLAN    Diagnoses and all orders for this visit:    1. Anxiety (Primary)  Comments:  cont effexor and trial of buspar bid, side effects and administration addresssed.  Orders:  -     busPIRone (BUSPAR) 5 MG tablet; Take 1 tablet by mouth 2 (Two) Times a Day.  Dispense: 60 tablet; Refill: 1    2. Dizziness  Comments:  check carotid dopple u/s  Orders:  -     Duplex Carotid Ultrasound CAR; Future         Tobacco Use: Low Risk  (2/27/2024)    Patient History     Smoking Tobacco Use: Never     Smokeless Tobacco Use: Never     Passive Exposure: Never       Follow Up     Return for Next scheduled follow up.      Patient was given instructions and counseling regarding his condition or for health maintenance advice. Please see specific information pulled into the AVS if appropriate.   I have reviewed information obtained and documented by others and I have confirmed the accuracy of this documented note.    ADALBERTO Salazar

## 2024-03-05 ENCOUNTER — TELEPHONE (OUTPATIENT)
Dept: ADMINISTRATIVE | Facility: HOSPITAL | Age: 44
End: 2024-03-05
Payer: COMMERCIAL

## 2024-03-12 ENCOUNTER — TELEPHONE (OUTPATIENT)
Dept: FAMILY MEDICINE CLINIC | Facility: CLINIC | Age: 44
End: 2024-03-12
Payer: COMMERCIAL

## 2024-03-12 NOTE — TELEPHONE ENCOUNTER
Hello, we see that you have not schedule your diagnotic testing, please call [phone] option [phone] to schedule this please.    Thank you.

## 2024-03-14 ENCOUNTER — TELEPHONE (OUTPATIENT)
Dept: ADMINISTRATIVE | Facility: HOSPITAL | Age: 44
End: 2024-03-14
Payer: COMMERCIAL

## 2024-03-19 ENCOUNTER — PREP FOR SURGERY (OUTPATIENT)
Dept: OTHER | Facility: HOSPITAL | Age: 44
End: 2024-03-19
Payer: COMMERCIAL

## 2024-03-19 DIAGNOSIS — I48.0 PAROXYSMAL ATRIAL FIBRILLATION: Primary | Chronic | ICD-10-CM

## 2024-03-19 RX ORDER — ACETAMINOPHEN 325 MG/1
650 TABLET ORAL EVERY 4 HOURS PRN
OUTPATIENT
Start: 2024-03-19

## 2024-03-19 RX ORDER — ONDANSETRON 2 MG/ML
4 INJECTION INTRAMUSCULAR; INTRAVENOUS EVERY 6 HOURS PRN
OUTPATIENT
Start: 2024-03-19

## 2024-03-19 RX ORDER — NITROGLYCERIN 0.4 MG/1
0.4 TABLET SUBLINGUAL
OUTPATIENT
Start: 2024-03-19

## 2024-03-19 RX ORDER — SODIUM CHLORIDE 9 MG/ML
40 INJECTION, SOLUTION INTRAVENOUS AS NEEDED
OUTPATIENT
Start: 2024-03-19

## 2024-03-31 DIAGNOSIS — F41.9 ANXIETY: ICD-10-CM

## 2024-04-01 RX ORDER — VENLAFAXINE HYDROCHLORIDE 37.5 MG/1
37.5 CAPSULE, EXTENDED RELEASE ORAL DAILY
Qty: 30 CAPSULE | Refills: 0 | Status: SHIPPED | OUTPATIENT
Start: 2024-04-01

## 2024-04-11 NOTE — TELEPHONE ENCOUNTER
Need to call patient to verify if he is taking it or not:    The original prescription was discontinued on 1/26/2024 by Yadira Gilbert APRN for the following reason: *Therapy completed. Renewing this prescription may not be appropriate    Cardiology note 2/27/24 states: He is now back on sotalol but does not like taking it

## 2024-04-17 RX ORDER — SOTALOL HYDROCHLORIDE 80 MG/1
TABLET ORAL
Qty: 90 TABLET | Refills: 0 | OUTPATIENT
Start: 2024-04-17

## 2024-04-30 DIAGNOSIS — K21.9 GASTROESOPHAGEAL REFLUX DISEASE WITHOUT ESOPHAGITIS: ICD-10-CM

## 2024-04-30 DIAGNOSIS — I10 ESSENTIAL HYPERTENSION: ICD-10-CM

## 2024-04-30 DIAGNOSIS — F41.9 ANXIETY: ICD-10-CM

## 2024-05-01 ENCOUNTER — OFFICE VISIT (OUTPATIENT)
Dept: FAMILY MEDICINE CLINIC | Facility: CLINIC | Age: 44
End: 2024-05-01
Payer: COMMERCIAL

## 2024-05-01 VITALS
BODY MASS INDEX: 29.16 KG/M2 | HEIGHT: 74 IN | SYSTOLIC BLOOD PRESSURE: 134 MMHG | WEIGHT: 227.2 LBS | OXYGEN SATURATION: 97 % | DIASTOLIC BLOOD PRESSURE: 88 MMHG | HEART RATE: 65 BPM

## 2024-05-01 DIAGNOSIS — I10 ESSENTIAL HYPERTENSION: ICD-10-CM

## 2024-05-01 DIAGNOSIS — K21.9 GASTROESOPHAGEAL REFLUX DISEASE WITHOUT ESOPHAGITIS: Primary | ICD-10-CM

## 2024-05-01 DIAGNOSIS — F41.9 ANXIETY: ICD-10-CM

## 2024-05-01 DIAGNOSIS — R42 DIZZINESS: ICD-10-CM

## 2024-05-01 PROCEDURE — 99214 OFFICE O/P EST MOD 30 MIN: CPT | Performed by: NURSE PRACTITIONER

## 2024-05-01 RX ORDER — VENLAFAXINE HYDROCHLORIDE 37.5 MG/1
37.5 CAPSULE, EXTENDED RELEASE ORAL DAILY
Qty: 30 CAPSULE | Refills: 0 | OUTPATIENT
Start: 2024-05-01

## 2024-05-01 RX ORDER — VENLAFAXINE HYDROCHLORIDE 75 MG/1
75 CAPSULE, EXTENDED RELEASE ORAL DAILY
Qty: 90 CAPSULE | Refills: 1 | Status: SHIPPED | OUTPATIENT
Start: 2024-05-01

## 2024-05-01 RX ORDER — LOSARTAN POTASSIUM 50 MG/1
50 TABLET ORAL DAILY
Qty: 90 TABLET | Refills: 1 | Status: SHIPPED | OUTPATIENT
Start: 2024-05-01

## 2024-05-01 RX ORDER — BUSPIRONE HYDROCHLORIDE 10 MG/1
10 TABLET ORAL 2 TIMES DAILY
Qty: 60 TABLET | Refills: 3 | Status: SHIPPED | OUTPATIENT
Start: 2024-05-01

## 2024-05-01 RX ORDER — OMEPRAZOLE 40 MG/1
40 CAPSULE, DELAYED RELEASE ORAL DAILY
Qty: 90 CAPSULE | Refills: 1 | OUTPATIENT
Start: 2024-05-01

## 2024-05-01 RX ORDER — LOSARTAN POTASSIUM 50 MG/1
50 TABLET ORAL DAILY
Qty: 90 TABLET | Refills: 1 | OUTPATIENT
Start: 2024-05-01

## 2024-05-01 RX ORDER — OMEPRAZOLE 40 MG/1
40 CAPSULE, DELAYED RELEASE ORAL DAILY
Qty: 90 CAPSULE | Refills: 1 | Status: SHIPPED | OUTPATIENT
Start: 2024-05-01

## 2024-05-01 NOTE — PROGRESS NOTES
Chief Complaint  Follow-up, Anxiety, Hypertension, Dizziness (/), and Heartburn    SUBJECTIVE  Daniele Bowers presents to [facility] 6 month follow up.   Patient is going to have cardiac ablation next week with provider in [place].    Patient states he is still experiencing anxiety.  He has been taking the BuSpar 5 mg twice daily but states he can even tell that he takes it currently.  He is worried about his aging parents, and having another episode of A-fib, he states he has a lot of anxiety about these issues.      History of Present Illness  Past Medical History:   Diagnosis Date    Allergies     Ankle pain, right     Anxiety     GERD (gastroesophageal reflux disease)     Hypertension     Paroxysmal atrial fibrillation 01/25/2022      Family History   Problem Relation Age of Onset    Diabetes Mother     Diabetes Father     Heart disease Other       Past Surgical History:   Procedure Laterality Date    APPENDECTOMY          Current Outpatient Medications:     apixaban (Eliquis) 5 MG tablet tablet, Take 1 tablet by mouth Every 12 (Twelve) Hours., Disp: 180 tablet, Rfl: 3    hydrOXYzine (ATARAX) 25 MG tablet, Take 1 tablet by mouth Every 8 (Eight) Hours As Needed for Itching., Disp: 15 tablet, Rfl: 0    levocetirizine (XYZAL) 5 MG tablet, Take 1 tablet by mouth Every Evening., Disp: , Rfl:     losartan (COZAAR) 50 MG tablet, Take 1 tablet by mouth Daily., Disp: 90 tablet, Rfl: 1    meclizine (ANTIVERT) 25 MG tablet, Take 1 tablet by mouth 2 (Two) Times a Day As Needed for Dizziness., Disp: 60 tablet, Rfl: 3    omeprazole (priLOSEC) 40 MG capsule, Take 1 capsule by mouth Daily., Disp: 90 capsule, Rfl: 1    Sotalol HCl AF 80 MG tablet, Take 0.5 tablets by mouth 2 (Two) Times a Day., Disp: 180 tablet, Rfl: 3    venlafaxine XR (EFFEXOR-XR) 37.5 MG 24 hr capsule, TAKE 1 CAPSULE BY MOUTH DAILY, Disp: 30 capsule, Rfl: 0    venlafaxine XR (EFFEXOR-XR) 75 MG 24 hr capsule, Take 1  "capsule by mouth Daily., Disp: 90 capsule, Rfl: 1    busPIRone (BUSPAR) 10 MG tablet, Take 1 tablet by mouth 2 (Two) Times a Day., Disp: 60 tablet, Rfl: 3    OBJECTIVE  Vital Signs:   /88   Pulse 65   Ht 188 cm (74\")   Wt 103 kg (227 lb 3.2 oz)   SpO2 97%   BMI 29.17 kg/m²    Estimated body mass index is 29.17 kg/m² as calculated from the following:    Height as of this encounter: 188 cm (74\").    Weight as of this encounter: 103 kg (227 lb 3.2 oz).     Wt Readings from Last 3 Encounters:   05/01/24 103 kg (227 lb 3.2 oz)   02/27/24 99.8 kg (220 lb)   02/27/24 103 kg (227 lb 6.4 oz)     BP Readings from Last 3 Encounters:   05/01/24 134/88   02/27/24 134/90   02/27/24 138/84       Physical Exam  Vitals reviewed.   Constitutional:       Appearance: Normal appearance. He is well-developed.   HENT:      Head: Normocephalic and atraumatic.      Right Ear: External ear normal.      Left Ear: External ear normal.      Mouth/Throat:      Pharynx: No oropharyngeal exudate.   Eyes:      Conjunctiva/sclera: Conjunctivae normal.      Pupils: Pupils are equal, round, and reactive to light.   Cardiovascular:      Rate and Rhythm: Normal rate and regular rhythm.      Pulses: Normal pulses.      Heart sounds: Normal heart sounds. No murmur heard.     No friction rub. No gallop.   Pulmonary:      Effort: Pulmonary effort is normal.      Breath sounds: Normal breath sounds. No wheezing or rhonchi.   Skin:     General: Skin is warm and dry.   Neurological:      Mental Status: He is alert and oriented to person, place, and time.      Cranial Nerves: No cranial nerve deficit.   Psychiatric:         Mood and Affect: Mood and affect normal.         Behavior: Behavior normal.         Thought Content: Thought content normal.         Judgment: Judgment normal.          Result Review          The above data has been reviewed by ADALBERTO Salazar 05/01/2024 10:53 EDT.          Patient Care Team:  Penelope An APRN as PCP - " General (Family Medicine)  Tony Leon MD as Consulting Physician (Cardiology)  Drew Batres DO as Consulting Physician (Cardiology)            ASSESSMENT & PLAN    Diagnoses and all orders for this visit:    1. Gastroesophageal reflux disease without esophagitis (Primary)  Comments:  well controlled, cont current medications  Orders:  -     omeprazole (priLOSEC) 40 MG capsule; Take 1 capsule by mouth Daily.  Dispense: 90 capsule; Refill: 1    2. Anxiety  Comments:  Not controlled, increase BuSpar to 10 mg twice daily or 3 times daily if needed will refer to psych provider.  Orders:  -     venlafaxine XR (EFFEXOR-XR) 75 MG 24 hr capsule; Take 1 capsule by mouth Daily.  Dispense: 90 capsule; Refill: 1  -     busPIRone (BUSPAR) 10 MG tablet; Take 1 tablet by mouth 2 (Two) Times a Day.  Dispense: 60 tablet; Refill: 3  -     Ambulatory Referral to Psychiatry    3. Essential hypertension  Comments:  well controlled, cont current medications  Orders:  -     losartan (COZAAR) 50 MG tablet; Take 1 tablet by mouth Daily.  Dispense: 90 tablet; Refill: 1    4. Dizziness  -     Duplex Carotid Ultrasound CAR; Future         Tobacco Use: Low Risk  (5/1/2024)    Patient History     Smoking Tobacco Use: Never     Smokeless Tobacco Use: Never     Passive Exposure: Never       Follow Up     Return in about 3 months (around 8/1/2024).        Patient was given instructions and counseling regarding his condition or for health maintenance advice. Please see specific information pulled into the AVS if appropriate.   I have reviewed information obtained and documented by others and I have confirmed the accuracy of this documented note.    Penelope An, APRN

## 2024-05-06 NOTE — NURSING NOTE
" PRE-PVA ASSESSMENT  Daniele Faustin 1980   [address] [place] [address]   [phone]      Referral Source: Yadira Gilbert   Information obtained from: [x] Medical record review  [x] Patient report  Scheduled for: PVA on 5/9/24 with Dr. Batres  No Known Allergies    AFib Specific History:  AFIBTYPE: paroxysmal    CHADS-VASc Risk Assessment               1 Total Score    1 Hypertension    Criteria that do not apply:    CHF    Age >/= 75    DM    PRIOR STROKE/TIA/THROMBO    Vascular Disease    Age 65-74    Sex: Female            Anticoagulation: Eliquis 5 mg bid NO MISSED DOSES   Cardioversion x 1  Failed AAD(s): sotalol   Prior Ablation: No     Is Mr. Bowers aware of his AFib? Yes   Onset: ~8 years ago    Exacerbations: None   Frequency: last episode in Jan  Alleviations: None    Duration: hours-days     Symptoms:   [] Palpitations:    [] Chest Discomfort:    [] Dizziness:    [] Presyncope:    [] Lightheadedness:   [] Syncope:    [] Fatigue:    [x] Other: \"heart races\"    [x] Short of Breath:         Past medical History:   [] Diabetes   No              No results found for: \"HGBA1C\"       [] HYPOthyroidism No [] HYPERthyroidism No         TSH   Date Value Ref Range Status   02/22/2024 1.370 0.270 - 4.200 uIU/mL Final   01/07/2024 2.800 0.270 - 4.200 uIU/mL Final     [x] HTN        [x] Tx cozaar    [] Heart Failure  No   [] CVA   No                             [] TIA  No         [] Ischemic         [] Hemorrhagic         [] Nonischemic         [] Embolic        [] Diastolic    [] CAD   No       [] MI  No           [] Dyslipidemia No   [] Statin indicated    [x] Ischemic Evaluation       [x] Stress Test: Treadmill GXT reportedly WNL, DD        [] Heart Cath: No     [] Sleep Apnea Suspected Sleep study pending, results noted under media,  messaged PCP for direction    [] Obesity No BMI 28     Other Pertinent PMH: GERD    Summary of Patient Contact:    I spoke with Mr. Bowers about his upcoming PVA.   " He was well informed about the procedure from prior discussion with Dr. Batres and from reading the provided literature.  We discussed the procedure at length including risks, anesthesia, intra-op procedures, sheath removal, discharge criteria, normal post-procedure expectations, and success rates.  I answered a few remaining questions. Mr. Bowers verbalized understanding and he is ready to proceed.      Toyin Broussard RN

## 2024-05-07 ENCOUNTER — TELEPHONE (OUTPATIENT)
Dept: CARDIOLOGY | Facility: CLINIC | Age: 44
End: 2024-05-07
Payer: COMMERCIAL

## 2024-05-07 DIAGNOSIS — G47.39 SLEEP APNEA-LIKE BEHAVIOR: Primary | ICD-10-CM

## 2024-05-08 ENCOUNTER — HOSPITAL ENCOUNTER (OUTPATIENT)
Dept: CT IMAGING | Facility: HOSPITAL | Age: 44
Discharge: HOME | End: 2024-05-08
Payer: COMMERCIAL

## 2024-05-08 ENCOUNTER — PRE-ADMISSION TESTING (OUTPATIENT)
Dept: PREADMISSION TESTING | Facility: HOSPITAL | Age: 44
End: 2024-05-08
Payer: COMMERCIAL

## 2024-05-08 DIAGNOSIS — I48.0 PAROXYSMAL ATRIAL FIBRILLATION: Chronic | ICD-10-CM

## 2024-05-08 LAB
ANION GAP SERPL CALCULATED.3IONS-SCNC: 10 MMOL/L (ref 5–15)
BUN SERPL-MCNC: 10 MG/DL (ref 6–20)
BUN/CREAT SERPL: 9.3 (ref 7–25)
CALCIUM SPEC-SCNC: 9.9 MG/DL (ref 8.6–10.5)
CHLORIDE SERPL-SCNC: 103 MMOL/L (ref 98–107)
CO2 SERPL-SCNC: 27 MMOL/L (ref 22–29)
CREAT SERPL-MCNC: 1.08 MG/DL (ref 0.76–1.27)
DEPRECATED RDW RBC AUTO: 38 FL (ref 37–54)
EGFRCR SERPLBLD CKD-EPI 2021: 87.9 ML/MIN/1.73
ERYTHROCYTE [DISTWIDTH] IN BLOOD BY AUTOMATED COUNT: 11.8 % (ref 12.3–15.4)
GLUCOSE SERPL-MCNC: 92 MG/DL (ref 65–99)
HCT VFR BLD AUTO: 45.8 % (ref 37.5–51)
HGB BLD-MCNC: 15.5 G/DL (ref 13–17.7)
MCH RBC QN AUTO: 30.2 PG (ref 26.6–33)
MCHC RBC AUTO-ENTMCNC: 33.8 G/DL (ref 31.5–35.7)
MCV RBC AUTO: 89.1 FL (ref 79–97)
PLATELET # BLD AUTO: 337 10*3/MM3 (ref 140–450)
PMV BLD AUTO: 8.6 FL (ref 6–12)
POTASSIUM SERPL-SCNC: 3.9 MMOL/L (ref 3.5–5.2)
RBC # BLD AUTO: 5.14 10*6/MM3 (ref 4.14–5.8)
SODIUM SERPL-SCNC: 140 MMOL/L (ref 136–145)
WBC NRBC COR # BLD AUTO: 4.65 10*3/MM3 (ref 3.4–10.8)

## 2024-05-08 PROCEDURE — 25510000001 IOPAMIDOL PER 1 ML: Performed by: INTERNAL MEDICINE

## 2024-05-08 PROCEDURE — 80048 BASIC METABOLIC PNL TOTAL CA: CPT

## 2024-05-08 PROCEDURE — 71275 CT ANGIOGRAPHY CHEST: CPT

## 2024-05-08 PROCEDURE — 36415 COLL VENOUS BLD VENIPUNCTURE: CPT

## 2024-05-08 PROCEDURE — 85027 COMPLETE CBC AUTOMATED: CPT

## 2024-05-08 RX ADMIN — IOPAMIDOL 65 ML: 755 INJECTION, SOLUTION INTRAVENOUS at 16:02

## 2024-05-08 NOTE — PAT
An arrival time for procedure was not provided during PAT visit. If patient had any questions or concerns about their arrival time, they were instructed to contact their surgeon/physician.  Additionally, if the patient referred to an arrival time that was acquired from their my chart account, patient was encouraged to verify that time with their surgeon/physician. Arrival times are NOT provided in Pre Admission Testing Department.    Patient viewed general PAT education video as instructed in their preoperative information received from their surgeon.  Patient stated the general PAT education video was viewed in its entirety and survey completed.  Copies of PAT general education handouts (Incentive Spirometry, Meds to Beds Program, Patient Belongings, Pre-op skin preparation instructions, Blood Glucose testing, Visitor policy, Surgery FAQ, Code H) distributed to patient if not printed. Education related to the PAT pass and skin preparation for surgery (if applicable) completed in PAT as a reinforcement to PAT education video. Patient instructed to return PAT pass provided today as well as completed skin preparation sheet (if applicable) on the day of procedure.     Additionally if patient had not viewed video yet but intended to view it at home or in our waiting area, then referred them to the handout with QR code/link provided during PAT visit.  Instructed patient to complete survey after viewing the video in its entirety.  Encouraged patient/family to read PAT general education handouts thoroughly and notify PAT staff with any questions or concerns. Patient verbalized understanding of all information and priority content.    Patient denies any current skin issues.     Patient directed to Radiology Department for CT after Pre Admission Testing Appointment.     Pt to hold Sotalol 3 days prior to ablation and Eliquis morning of ablation per Dr. Batres. Pt verbalizes understanding

## 2024-05-09 ENCOUNTER — ANESTHESIA EVENT (OUTPATIENT)
Dept: CARDIOLOGY | Facility: HOSPITAL | Age: 44
End: 2024-05-09
Payer: COMMERCIAL

## 2024-05-09 ENCOUNTER — HOSPITAL ENCOUNTER (OUTPATIENT)
Facility: HOSPITAL | Age: 44
Discharge: HOME | End: 2024-05-09
Attending: INTERNAL MEDICINE | Admitting: INTERNAL MEDICINE
Payer: COMMERCIAL

## 2024-05-09 ENCOUNTER — ANESTHESIA (OUTPATIENT)
Dept: CARDIOLOGY | Facility: HOSPITAL | Age: 44
End: 2024-05-09
Payer: COMMERCIAL

## 2024-05-09 VITALS
HEIGHT: 74 IN | SYSTOLIC BLOOD PRESSURE: 118 MMHG | BODY MASS INDEX: 28.83 KG/M2 | OXYGEN SATURATION: 95 % | RESPIRATION RATE: 24 BRPM | HEART RATE: 90 BPM | TEMPERATURE: 97 F | DIASTOLIC BLOOD PRESSURE: 73 MMHG | WEIGHT: 224.6 LBS

## 2024-05-09 DIAGNOSIS — I48.0 PAROXYSMAL ATRIAL FIBRILLATION: ICD-10-CM

## 2024-05-09 LAB — ACT BLD: 342 SECONDS (ref 82–152)

## 2024-05-09 PROCEDURE — 93657 TX L/R ATRIAL FIB ADDL: CPT | Performed by: INTERNAL MEDICINE

## 2024-05-09 PROCEDURE — 93656 COMPRE EP EVAL ABLTJ ATR FIB: CPT | Performed by: INTERNAL MEDICINE

## 2024-05-09 PROCEDURE — S0260 H&P FOR SURGERY: HCPCS | Performed by: INTERNAL MEDICINE

## 2024-05-09 PROCEDURE — 25010000002 DEXAMETHASONE PER 1 MG: Performed by: NURSE ANESTHETIST, CERTIFIED REGISTERED

## 2024-05-09 PROCEDURE — C1759 CATH, INTRA ECHOCARDIOGRAPHY: HCPCS | Performed by: INTERNAL MEDICINE

## 2024-05-09 PROCEDURE — C1766 INTRO/SHEATH,STRBLE,NON-PEEL: HCPCS | Performed by: INTERNAL MEDICINE

## 2024-05-09 PROCEDURE — 93623 PRGRMD STIMJ&PACG IV RX NFS: CPT | Performed by: INTERNAL MEDICINE

## 2024-05-09 PROCEDURE — 93005 ELECTROCARDIOGRAM TRACING: CPT | Performed by: INTERNAL MEDICINE

## 2024-05-09 PROCEDURE — 25010000002 ADENOSINE PER 6 MG: Performed by: INTERNAL MEDICINE

## 2024-05-09 PROCEDURE — C1894 INTRO/SHEATH, NON-LASER: HCPCS | Performed by: INTERNAL MEDICINE

## 2024-05-09 PROCEDURE — 25010000002 PROTAMINE SULFATE PER 10 MG: Performed by: INTERNAL MEDICINE

## 2024-05-09 PROCEDURE — 25010000002 ONDANSETRON PER 1 MG: Performed by: NURSE ANESTHETIST, CERTIFIED REGISTERED

## 2024-05-09 PROCEDURE — 93622 COMP EP EVAL L VENTR PAC&REC: CPT | Performed by: INTERNAL MEDICINE

## 2024-05-09 PROCEDURE — 25010000002 PROPOFOL 10 MG/ML EMULSION: Performed by: NURSE ANESTHETIST, CERTIFIED REGISTERED

## 2024-05-09 PROCEDURE — 25010000002 HEPARIN (PORCINE) PER 1000 UNITS: Performed by: INTERNAL MEDICINE

## 2024-05-09 PROCEDURE — 25810000003 SODIUM CHLORIDE 0.9 % SOLUTION: Performed by: INTERNAL MEDICINE

## 2024-05-09 PROCEDURE — C1733 CATH, EP, OTHR THAN COOL-TIP: HCPCS | Performed by: INTERNAL MEDICINE

## 2024-05-09 PROCEDURE — 85347 COAGULATION TIME ACTIVATED: CPT

## 2024-05-09 PROCEDURE — C1769 GUIDE WIRE: HCPCS | Performed by: INTERNAL MEDICINE

## 2024-05-09 PROCEDURE — C1730 CATH, EP, 19 OR FEW ELECT: HCPCS | Performed by: INTERNAL MEDICINE

## 2024-05-09 PROCEDURE — 93655 ICAR CATH ABLTJ DSCRT ARRHYT: CPT | Performed by: INTERNAL MEDICINE

## 2024-05-09 PROCEDURE — 25010000002 SUGAMMADEX 200 MG/2ML SOLUTION: Performed by: NURSE ANESTHETIST, CERTIFIED REGISTERED

## 2024-05-09 RX ORDER — LIDOCAINE HYDROCHLORIDE 10 MG/ML
INJECTION, SOLUTION EPIDURAL; INFILTRATION; INTRACAUDAL; PERINEURAL AS NEEDED
Status: DISCONTINUED | OUTPATIENT
Start: 2024-05-09 | End: 2024-05-09 | Stop reason: SURG

## 2024-05-09 RX ORDER — SODIUM CHLORIDE 9 MG/ML
INJECTION, SOLUTION INTRAVENOUS
Status: COMPLETED | OUTPATIENT
Start: 2024-05-09 | End: 2024-05-09

## 2024-05-09 RX ORDER — PROTAMINE SULFATE 10 MG/ML
INJECTION, SOLUTION INTRAVENOUS
Status: DISCONTINUED | OUTPATIENT
Start: 2024-05-09 | End: 2024-05-09 | Stop reason: HOSPADM

## 2024-05-09 RX ORDER — HEPARIN SODIUM 1000 [USP'U]/ML
INJECTION, SOLUTION INTRAVENOUS; SUBCUTANEOUS
Status: DISCONTINUED | OUTPATIENT
Start: 2024-05-09 | End: 2024-05-09 | Stop reason: HOSPADM

## 2024-05-09 RX ORDER — ACETAMINOPHEN 325 MG/1
650 TABLET ORAL EVERY 4 HOURS PRN
Status: DISCONTINUED | OUTPATIENT
Start: 2024-05-09 | End: 2024-05-09 | Stop reason: HOSPADM

## 2024-05-09 RX ORDER — DEXAMETHASONE SODIUM PHOSPHATE 4 MG/ML
INJECTION, SOLUTION INTRA-ARTICULAR; INTRALESIONAL; INTRAMUSCULAR; INTRAVENOUS; SOFT TISSUE AS NEEDED
Status: DISCONTINUED | OUTPATIENT
Start: 2024-05-09 | End: 2024-05-09 | Stop reason: SURG

## 2024-05-09 RX ORDER — SODIUM CHLORIDE 9 MG/ML
40 INJECTION, SOLUTION INTRAVENOUS AS NEEDED
Status: DISCONTINUED | OUTPATIENT
Start: 2024-05-09 | End: 2024-05-09 | Stop reason: HOSPADM

## 2024-05-09 RX ORDER — FAMOTIDINE 20 MG/1
20 TABLET, FILM COATED ORAL ONCE
Status: COMPLETED | OUTPATIENT
Start: 2024-05-09 | End: 2024-05-09

## 2024-05-09 RX ORDER — ONDANSETRON 2 MG/ML
4 INJECTION INTRAMUSCULAR; INTRAVENOUS ONCE AS NEEDED
Status: DISCONTINUED | OUTPATIENT
Start: 2024-05-09 | End: 2024-05-09 | Stop reason: HOSPADM

## 2024-05-09 RX ORDER — IPRATROPIUM BROMIDE AND ALBUTEROL SULFATE 2.5; .5 MG/3ML; MG/3ML
3 SOLUTION RESPIRATORY (INHALATION) ONCE AS NEEDED
Status: DISCONTINUED | OUTPATIENT
Start: 2024-05-09 | End: 2024-05-09 | Stop reason: HOSPADM

## 2024-05-09 RX ORDER — ADENOSINE 3 MG/ML
INJECTION, SOLUTION INTRAVENOUS
Status: DISCONTINUED | OUTPATIENT
Start: 2024-05-09 | End: 2024-05-09 | Stop reason: HOSPADM

## 2024-05-09 RX ORDER — ROCURONIUM BROMIDE 10 MG/ML
INJECTION, SOLUTION INTRAVENOUS AS NEEDED
Status: DISCONTINUED | OUTPATIENT
Start: 2024-05-09 | End: 2024-05-09 | Stop reason: SURG

## 2024-05-09 RX ORDER — NITROGLYCERIN 0.4 MG/1
0.4 TABLET SUBLINGUAL
Status: DISCONTINUED | OUTPATIENT
Start: 2024-05-09 | End: 2024-05-09 | Stop reason: HOSPADM

## 2024-05-09 RX ORDER — PROPOFOL 10 MG/ML
VIAL (ML) INTRAVENOUS AS NEEDED
Status: DISCONTINUED | OUTPATIENT
Start: 2024-05-09 | End: 2024-05-09 | Stop reason: SURG

## 2024-05-09 RX ORDER — HEPARIN SODIUM 10000 [USP'U]/100ML
INJECTION, SOLUTION INTRAVENOUS
Status: DISCONTINUED | OUTPATIENT
Start: 2024-05-09 | End: 2024-05-09 | Stop reason: HOSPADM

## 2024-05-09 RX ORDER — BUPIVACAINE HCL/0.9 % NACL/PF 0.125 %
PLASTIC BAG, INJECTION (ML) EPIDURAL AS NEEDED
Status: DISCONTINUED | OUTPATIENT
Start: 2024-05-09 | End: 2024-05-09 | Stop reason: SURG

## 2024-05-09 RX ORDER — ONDANSETRON 2 MG/ML
INJECTION INTRAMUSCULAR; INTRAVENOUS AS NEEDED
Status: DISCONTINUED | OUTPATIENT
Start: 2024-05-09 | End: 2024-05-09 | Stop reason: SURG

## 2024-05-09 RX ORDER — ONDANSETRON 2 MG/ML
4 INJECTION INTRAMUSCULAR; INTRAVENOUS EVERY 6 HOURS PRN
Status: DISCONTINUED | OUTPATIENT
Start: 2024-05-09 | End: 2024-05-09 | Stop reason: HOSPADM

## 2024-05-09 RX ADMIN — DEXAMETHASONE SODIUM PHOSPHATE 4 MG: 4 INJECTION, SOLUTION INTRAMUSCULAR; INTRAVENOUS at 10:55

## 2024-05-09 RX ADMIN — Medication 200 MCG: at 11:04

## 2024-05-09 RX ADMIN — SODIUM CHLORIDE: 9 INJECTION, SOLUTION INTRAVENOUS at 10:20

## 2024-05-09 RX ADMIN — FAMOTIDINE 20 MG: 20 TABLET, FILM COATED ORAL at 10:15

## 2024-05-09 RX ADMIN — SUGAMMADEX 200 MG: 100 INJECTION, SOLUTION INTRAVENOUS at 11:31

## 2024-05-09 RX ADMIN — LIDOCAINE HYDROCHLORIDE 50 MG: 10 INJECTION, SOLUTION EPIDURAL; INFILTRATION; INTRACAUDAL; PERINEURAL at 10:29

## 2024-05-09 RX ADMIN — PROPOFOL 100 MG: 10 INJECTION, EMULSION INTRAVENOUS at 11:25

## 2024-05-09 RX ADMIN — Medication 200 MCG: at 11:18

## 2024-05-09 RX ADMIN — ROCURONIUM BROMIDE 50 MG: 10 SOLUTION INTRAVENOUS at 10:29

## 2024-05-09 RX ADMIN — PROPOFOL 200 MG: 10 INJECTION, EMULSION INTRAVENOUS at 10:29

## 2024-05-09 RX ADMIN — Medication 200 MCG: at 11:23

## 2024-05-09 RX ADMIN — ONDANSETRON 4 MG: 2 INJECTION INTRAMUSCULAR; INTRAVENOUS at 10:55

## 2024-05-09 RX ADMIN — PROPOFOL 100 MG: 10 INJECTION, EMULSION INTRAVENOUS at 10:34

## 2024-05-09 NOTE — Clinical Note
Hemostasis started on the right femoral vein. Figure 8 suturing was used in achieving hemostasis. Closure device deployed in the vessel. Hemostasis achieved successfully. Closure device additional comment: x3

## 2024-05-09 NOTE — H&P
[facility] Electrophysiology    Date of Hospital Visit: 24    Place of Service: [facility]    Patient Name: Daniele Bowers  :1980      Primary Care Provider: Penelope An APRN    Chief complaint: Atrial fibrillation      Problem List:  Active Hospital Problems    Diagnosis  POA    **Paroxysmal atrial fibrillation [I48.0]  Yes    Hyperlipidemia [E78.5]  Yes    GERD (gastroesophageal reflux disease) [K21.9]  Yes    Hypertension [I10]  Yes       History of Present Illness:  This is a 43 y.o. patient recently evaluated in the electrophysiology clinic for persistent atrial fibrillation.  Symptoms include awareness of palpitations, dizziness, shortness of breath.          No Known Allergies    Current Outpatient Medications   Medication Instructions    apixaban (ELIQUIS) 5 mg, Oral, Every 12 Hours Scheduled    busPIRone (BUSPAR) 10 mg, Oral, 2 Times Daily    levocetirizine (XYZAL) 5 mg, Oral, Every Evening    losartan (COZAAR) 50 mg, Oral, Daily    omeprazole (PRILOSEC) 40 mg, Oral, Daily    Sotalol HCl AF 40 mg, Oral, 2 Times Daily    venlafaxine XR (EFFEXOR-XR) 37.5 mg, Oral, Daily    venlafaxine XR (EFFEXOR-XR) 75 mg, Oral, Daily           Social History     Socioeconomic History    Marital status:    Tobacco Use    Smoking status: Never     Passive exposure: Never    Smokeless tobacco: Never   Vaping Use    Vaping status: Never Used   Substance and Sexual Activity    Alcohol use: Never    Drug use: Never    Sexual activity: Defer       Family History   Problem Relation Age of Onset    Diabetes Mother     Diabetes Father     Heart disease Other        REVIEW OF SYSTEMS:   Constitutional: Positive for malaise/fatigue. Negative for diaphoresis and weight gain.   Cardiovascular:  Positive for palpitations. Negative for chest pain, claudication, dyspnea on exertion, irregular heartbeat, leg swelling, orthopnea, paroxysmal nocturnal dyspnea and syncope.  "  Respiratory:  Positive for shortness of breath. Negative for cough, sleep disturbances due to breathing and wheezing.    Hematologic/Lymphatic: Negative for bleeding problem.   Musculoskeletal:  Negative for muscle cramps, muscle weakness and myalgias.   Gastrointestinal:  Negative for heartburn.   Neurological:  Negative for weakness.            Objective:  Vitals:    05/09/24 0811 05/09/24 0815 05/09/24 0816   BP:  140/95 126/90   BP Location:  Right arm Left arm   Patient Position:  Lying Lying   Pulse:   77   Resp:   16   Temp:   96.9 °F (36.1 °C)   TempSrc:   Temporal   SpO2:   96%   Weight: 102 kg (224 lb 9.6 oz)     Height: 188 cm (74\")       Body mass index is 28.84 kg/m².      EXAM:  Constitutional:       Appearance: Well-developed.   Pulmonary:      Effort: Pulmonary effort is normal. No respiratory distress.      Breath sounds: Normal breath sounds. No wheezing. No rales.      Comments: Bases clear  Chest:      Chest wall: Not tender to palpatation.   Cardiovascular:      Normal rate. regular rhythm.      Murmurs: There is no murmur.      No gallop.  No click. No rub.   Pulses:     Intact distal pulses.   Edema:     Peripheral edema absent.   Musculoskeletal: Normal range of motion.     Lab Review:       Results from last 7 days   Lab Units 05/08/24  1450   SODIUM mmol/L 140   POTASSIUM mmol/L 3.9   CHLORIDE mmol/L 103   CO2 mmol/L 27.0   BUN mg/dL 10   CREATININE mg/dL 1.08   GLUCOSE mg/dL 92   CALCIUM mg/dL 9.9     Results from last 7 days   Lab Units 05/08/24  1450   WBC 10*3/mm3 4.65   HEMOGLOBIN g/dL 15.5   HEMATOCRIT % 45.8   PLATELETS 10*3/mm3 337     Estimated Creatinine Clearance: 113.6 mL/min (by C-G formula based on SCr of 1.08 mg/dL).  No results found for: \"INR\", \"PROTIME\"     Assessment:   Persistent atrial fibrillation        Plan:   PVA        MARGARET Juares    "

## 2024-05-09 NOTE — OP NOTE
"          Cardiac Electrophysiology Procedure Note           [facility] at [facility]         CATHETER ABLATION FOR ATRIAL FIBRILLATION (PVI)    PROCEDURES PERFORMED:    Catheter ablation of paroxysmal atrial fibrillation  Catheter ablation of typical right atrial flutter  Full diagnostic EP study  3D electroanatomic mapping  drug infusion / programmed pacing  Intracadiac Echocardiography  transeptal puncture  Left ventricular pacing and recording    PREPROCEDURAL DIAGNOSES:    1. Paroxysmal Atrial fibrillation    2. Typical right atrial flutter    POST PROCEDURE DIANGOSES:  As above.    INDICATION FOR PROCEDURE:  Briefly, Daniele Bowers is a 43 y.o. year old male with a history of recurrent PAF.    ANTICOAGULATION STRATEGY PRIOR TO AND POST PROCEDURE:  DOAC.  The last dose of anticoagulant was confirmed to have been taken this AM.      PT/INR:  No results found for: \"LABPROT\", \"INR\"  PTT:  No results found for: \"APTT\"    CBC:   WBC   Date Value Ref Range Status   05/08/2024 4.65 3.40 - 10.80 10*3/mm3 Final     RBC   Date Value Ref Range Status   05/08/2024 5.14 4.14 - 5.80 10*6/mm3 Final     Hemoglobin   Date Value Ref Range Status   05/08/2024 15.5 13.0 - 17.7 g/dL Final     Hematocrit   Date Value Ref Range Status   05/08/2024 45.8 37.5 - 51.0 % Final     MCV   Date Value Ref Range Status   05/08/2024 89.1 79.0 - 97.0 fL Final     RDW   Date Value Ref Range Status   05/08/2024 11.8 (L) 12.3 - 15.4 % Final     Platelets   Date Value Ref Range Status   05/08/2024 337 140 - 450 10*3/mm3 Final     BMP:     Sodium   Date Value Ref Range Status   05/08/2024 140 136 - 145 mmol/L Final     Potassium   Date Value Ref Range Status   05/08/2024 3.9 3.5 - 5.2 mmol/L Final     Comment:     Slight hemolysis detected by analyzer. Result may be falsely elevated.     Chloride   Date Value Ref Range Status   05/08/2024 103 98 - 107 mmol/L Final     CO2   Date Value Ref Range Status   05/08/2024 27.0 " "22.0 - 29.0 mmol/L Final     BUN   Date Value Ref Range Status   05/08/2024 10 6 - 20 mg/dL Final     Creatinine   Date Value Ref Range Status   05/08/2024 1.08 0.76 - 1.27 mg/dL Final     eGFR   Date Value Ref Range Status   05/08/2024 87.9 >60.0 mL/min/1.73 Final       Vital Signs: /90 (BP Location: Left arm, Patient Position: Lying)   Pulse 77   Temp 96.9 °F (36.1 °C) (Temporal)   Resp 16   Ht 188 cm (74\")   Wt 102 kg (224 lb 9.6 oz)   SpO2 96%   BMI 28.84 kg/m²      Admit Weight:  102 kg (224 lb 9.6 oz)  BMI: Body mass index is 28.84 kg/m².    PROCEDURE NARRATIVE:    The patient was able to give written informed consent after revisiting the key portions of the risk versus benefit profile of the procedure.  This discussion was framed by our lengthy conversations  (please see our detailed notes).  Patient verbalized strong understanding of this discussion and a strong desire to proceed with the procedure.  Please note that this detailed informed consent process utilized mutual and shared decision making process between all parties involved, principally the physician and patient, but also potentially with input from the patient's selected family and friends.    The patient was brought to the EP laboratory in the post absorptive state.  The patient was electively intubated for the procedure and given a general anesthetic by colleagues from anesthesia.  Please see the detailed anesthesia records.    The patient was then prepared and draped in a routing sterile fashion.  Seldinger access was obtained at the bilateral common femoral veins with 3 venipunctures.  J tip wires were advanced into the vascular space.  Short 11, 8 and a long  sheath were placed into the bilateral common femoral veins and the inferior vena cava / right atrium in an over the wire fashion.    The patient was anticoagulated with intravenous heparin (initial bolus and then continuous infusion) with a goal ACT of between 350 and 400 " seconds.  A phased array ICE catheter was placed into the right atrium and right ventricle.  This was used for transeptal puncture, monitoring of the pericardial space, monitoring of the ablation catheter position within the heart and monitoring of other cardiac structures such as the left atrial appendage (to document the absence of thrombus), pulmonary veins, esophagus, mitral valve annulus and other cardiac structures.    Armenta-septal puncture was performed after heparin administration with a combination of echocardiographic and fluoroscopic guidance.  This was performed with the long sheath, a BRK needle, and a SafeSept transeptal wire.  Catheters and sheaths were advanced safely into the left atrium.  A multielectrode electrophysiology catheter was used for pacing and recording in the left atrium, left atrial appendage, pulmonary veins and left ventricle at various times throughout the procedure.  We used the valuklik 3D electroanatomic mapping system in conjunction with these catheters to construct an electroanatomic shell of the left atrium, left atrial appendage, mitral valve annulus, interatrial septum and pulmonary veins.  Left ventricular pacing and recording were performed by placing catheters safely and carefully across the mitral valve annulus to the left ventricle from the left atrium.  Adequate sensing and pacing thresholds were obtained from the left ventricle.  AV conduction ( antegrade ) as well as VA conduction ( retrograde ) were studied.  This was performed as a distinct addition to the diagnostic EP study.  Findings were conclusive for no accessory pathway and VA dissociation.    Catheter ablation was performed to achieve pulmonary vein isolation.  A wide antral circumferential ablation approach was used.  Additional lesions were given within the antral lesion set at the jocelyne between superior and inferior veins to achieve total isolation, when and where necessary.        After completing the  antral ablation lesion set, I interrogated the pulmonary veins using and documented pulmonary vein isolation.    We turned our attention to performing typical atrial flutter ablation.  Please note that this is a separate SVT with a distinct mechanism from the patients atrial fibrillation.  Ablation was performed along the cavo tricuspid isthmus beginning at the ventricular aspect in extended to the caval aspect of the cavo tricuspid isthmus.  All electrograms were eliminated. We then demonstrated that there was bidirectional block with differential pacing maneuvers.      Adenosine 12 mg was administered intravenously following ablation to test for other atrial and or ventricular arrhythmias.   Programmed stimulation was performed in an attempt to induce other and additional arrhythmias.  No arrhythmias were induced during administration and washout.    The ICE catheter revealed that there was no pericardial effusion.    Catheters and sheaths were then removed from the body.    Hemostasis was achieved with Vascade closure devices.    The patient was extubated in routine fashion and transferred to recovery in stable condition.    COMPLICATIONS: none    EBL: minimal    KEY PROCEDURAL FINDINGS:     Successful wide antral pulmonary vein isolation and typical flutter ablation    POST PROCEDURAL PLAN:    Report was called the the recovery nurse responsible for the patients care.  Uninterrupted anticoagulation for not less than 90 days unless specially instructed otherwise by myself or another member of our EP physician team.  Please note that the patient and the patient’s family have been extensively counseled about this critical requirement and have agreed to comply.  Anticipate discharge this day.  Medications were reconciled, and key changes in medications include: stop sotalol  Patient and family instructed to call immediately for fever greater than 101.5 degrees F, hematemesis, increasing or severe chest pain,  increasing shortness of breath, bleeding or other concerns.  Patient and family instructed that some chest discomfort is normal and is to be expected and that this should be expected to decrease over the first 3-4 days after the ablation procedure.  The patient will be seen at our office per routine follow up.      Drew Batres DO, FACC, [facility]  Cardiac Electrophysiologist  [facility] / [facility]    '

## 2024-05-10 ENCOUNTER — CALL CENTER PROGRAMS (OUTPATIENT)
Dept: CALL CENTER | Facility: HOSPITAL | Age: 44
End: 2024-05-10
Payer: COMMERCIAL

## 2024-05-10 LAB
QT INTERVAL: 382 MS
QTC INTERVAL: 435 MS

## 2024-05-13 ENCOUNTER — TELEPHONE (OUTPATIENT)
Dept: CARDIOLOGY | Facility: CLINIC | Age: 44
End: 2024-05-13
Payer: COMMERCIAL

## 2024-05-13 NOTE — TELEPHONE ENCOUNTER
"Patient had a PVA on 5-9-24. He states \"that he has been experiencing a constant headache in his temples and feeling swimmy-headed\" since the procedure.    She states that his HR has remained in the 70's and he is still in NSR.     Since Sotalol was discontinued at hospital discharge, I asked him to check his BP and call me back with the reading.    He verbalized understanding.  "

## 2024-05-13 NOTE — TELEPHONE ENCOUNTER
Patient called back with his BP readings. They are 161/101, 145/99 and 143/99. He is in NSR with HR's in the 70's and 80's.    He would like to know if he needs to restart Sotalol or if he needs a different medication to lower his BP?

## 2024-05-14 RX ORDER — HYDROCHLOROTHIAZIDE 25 MG/1
25 TABLET ORAL DAILY
Qty: 30 TABLET | Refills: 6 | Status: SHIPPED | OUTPATIENT
Start: 2024-05-14

## 2024-05-22 DIAGNOSIS — F41.9 ANXIETY: ICD-10-CM

## 2024-05-22 RX ORDER — VENLAFAXINE HYDROCHLORIDE 37.5 MG/1
37.5 CAPSULE, EXTENDED RELEASE ORAL DAILY
Qty: 90 CAPSULE | Refills: 0 | Status: SHIPPED | OUTPATIENT
Start: 2024-05-22

## 2024-06-14 ENCOUNTER — HOSPITAL ENCOUNTER (OUTPATIENT)
Dept: CARDIOLOGY | Facility: HOSPITAL | Age: 44
Discharge: HOME | End: 2024-06-14
Payer: COMMERCIAL

## 2024-06-14 DIAGNOSIS — R42 DIZZINESS: ICD-10-CM

## 2024-06-14 LAB
BH CV XLRA MEAS LEFT CAROTID BULB EDV: 9.5 CM/SEC
BH CV XLRA MEAS LEFT CAROTID BULB PSV: 78 CM/SEC
BH CV XLRA MEAS LEFT DIST CCA EDV: 25.6 CM/SEC
BH CV XLRA MEAS LEFT DIST CCA PSV: 94 CM/SEC
BH CV XLRA MEAS LEFT DIST ICA EDV: 23.8 CM/SEC
BH CV XLRA MEAS LEFT DIST ICA PSV: 65.5 CM/SEC
BH CV XLRA MEAS LEFT ICA/CCA DIASTOLIC RATIO: 0.63
BH CV XLRA MEAS LEFT MID ICA EDV: 25 CM/SEC
BH CV XLRA MEAS LEFT MID ICA PSV: 69.6 CM/SEC
BH CV XLRA MEAS LEFT PROX CCA EDV: 27.4 CM/SEC
BH CV XLRA MEAS LEFT PROX CCA PSV: 95.2 CM/SEC
BH CV XLRA MEAS LEFT PROX ECA EDV: 24.4 CM/SEC
BH CV XLRA MEAS LEFT PROX ECA PSV: 100 CM/SEC
BH CV XLRA MEAS LEFT PROX ICA EDV: 24.4 CM/SEC
BH CV XLRA MEAS LEFT PROX ICA PSV: 59.5 CM/SEC
BH CV XLRA MEAS LEFT VERTEBRAL A EDV: 16.7 CM/SEC
BH CV XLRA MEAS LEFT VERTEBRAL A PSV: 54.7 CM/SEC
BH CV XLRA MEAS RIGHT CAROTID BULB EDV: 15.5 CM/SEC
BH CV XLRA MEAS RIGHT CAROTID BULB PSV: 59.1 CM/SEC
BH CV XLRA MEAS RIGHT DIST CCA EDV: 21.9 CM/SEC
BH CV XLRA MEAS RIGHT DIST CCA PSV: 83.1 CM/SEC
BH CV XLRA MEAS RIGHT DIST ICA EDV: 23.6 CM/SEC
BH CV XLRA MEAS RIGHT DIST ICA PSV: 64.4 CM/SEC
BH CV XLRA MEAS RIGHT ICA/CCA RATIO: 0.85
BH CV XLRA MEAS RIGHT MID ICA EDV: 31.4 CM/SEC
BH CV XLRA MEAS RIGHT MID ICA PSV: 75.2 CM/SEC
BH CV XLRA MEAS RIGHT PROX CCA EDV: 18 CM/SEC
BH CV XLRA MEAS RIGHT PROX CCA PSV: 88.3 CM/SEC
BH CV XLRA MEAS RIGHT PROX ECA EDV: 20.6 CM/SEC
BH CV XLRA MEAS RIGHT PROX ECA PSV: 96.8 CM/SEC
BH CV XLRA MEAS RIGHT PROX ICA EDV: 25.6 CM/SEC
BH CV XLRA MEAS RIGHT PROX ICA PSV: 70.9 CM/SEC
BH CV XLRA MEAS RIGHT VERTEBRAL A EDV: 10.7 CM/SEC
BH CV XLRA MEAS RIGHT VERTEBRAL A PSV: 46.4 CM/SEC
LEFT ARM BP: NORMAL MMHG
RIGHT ARM BP: NORMAL MMHG

## 2024-06-14 PROCEDURE — 93880 EXTRACRANIAL BILAT STUDY: CPT

## 2024-08-19 DIAGNOSIS — F41.9 ANXIETY: ICD-10-CM

## 2024-08-19 RX ORDER — VENLAFAXINE HYDROCHLORIDE 37.5 MG/1
37.5 CAPSULE, EXTENDED RELEASE ORAL DAILY
Qty: 30 CAPSULE | Refills: 0 | Status: SHIPPED | OUTPATIENT
Start: 2024-08-19

## 2024-08-19 NOTE — TELEPHONE ENCOUNTER
Cancelled 7/1/24  Last OV 5/1/24  No f/u appt scheduled  Patient referred to psych to manage anxiety/depression, multiple attempts made to schedule - no appt. Sent patient "[employer]hart message.

## 2024-08-26 PROBLEM — Z79.01 CHRONIC ANTICOAGULATION: Status: ACTIVE | Noted: 2024-08-26

## 2024-08-27 ENCOUNTER — OFFICE VISIT (OUTPATIENT)
Dept: CARDIOLOGY | Facility: CLINIC | Age: 44
End: 2024-08-27
Payer: COMMERCIAL

## 2024-08-27 VITALS
DIASTOLIC BLOOD PRESSURE: 84 MMHG | SYSTOLIC BLOOD PRESSURE: 108 MMHG | BODY MASS INDEX: 28.23 KG/M2 | OXYGEN SATURATION: 98 % | HEART RATE: 89 BPM | HEIGHT: 74 IN | WEIGHT: 220 LBS

## 2024-08-27 DIAGNOSIS — Z79.01 CHRONIC ANTICOAGULATION: ICD-10-CM

## 2024-08-27 DIAGNOSIS — I48.0 PAROXYSMAL ATRIAL FIBRILLATION: Primary | ICD-10-CM

## 2024-08-27 NOTE — PROGRESS NOTES
Cardiac Electrophysiology Outpatient Follow Up Note            [facility] at [facility]    Follow Up Office Visit      Daniele Bowers  [id number]  08/27/2024  [unfilled]  [unfilled]    Primary Care Physician: Penelope An APRN    Referred By: No ref. provider found    Subjective     Chief Complaint:   Diagnoses and all orders for this visit:    1. Paroxysmal atrial fibrillation (Primary)    2. Chronic anticoagulation      Chief Complaint   Patient presents with    Paroxysmal atrial fibrillation       History of Present Illness:   Daniele Bowers is a 44 y.o. male who presents to my electrophysiology clinic for follow up of feels great.  No further A-fib..      Past Medical History:   Past Medical History:   Diagnosis Date    Allergies     Anxiety     GERD (gastroesophageal reflux disease)     Hypertension     Paroxysmal atrial fibrillation 01/25/2022       Past Surgical History:   Past Surgical History:   Procedure Laterality Date    APPENDECTOMY      CARDIAC ELECTROPHYSIOLOGY PROCEDURE N/A 5/9/2024    Procedure: PVA (paroxysmal) w/PFA, hold Sotalol 3 days, hold Eliquis morning of;  Surgeon: Drew Batres DO;  Location: [facility] INVASIVE LOCATION;  Service: Cardiovascular;  Laterality: N/A;    ENDOSCOPY         Family History:   Family History   Problem Relation Age of Onset    Diabetes Mother     Diabetes Father     Hypertension Father     Heart disease Other        Social History:   Social History     Socioeconomic History    Marital status:    Tobacco Use    Smoking status: Never     Passive exposure: Never    Smokeless tobacco: Never   Vaping Use    Vaping status: Never Used   Substance and Sexual Activity    Alcohol use: Never    Drug use: Never    Sexual activity: Defer       Medications:     Current Outpatient Medications:     apixaban (Eliquis) 5 MG tablet tablet, Take 1 tablet by mouth Every 12 (Twelve) Hours., Disp: 180 tablet, Rfl: 3    " busPIRone (BUSPAR) 10 MG tablet, Take 1 tablet by mouth 2 (Two) Times a Day., Disp: 60 tablet, Rfl: 3    hydroCHLOROthiazide 25 MG tablet, Take 1 tablet by mouth Daily., Disp: 30 tablet, Rfl: 6    levocetirizine (XYZAL) 5 MG tablet, Take 1 tablet by mouth Every Evening., Disp: , Rfl:     losartan (COZAAR) 50 MG tablet, Take 1 tablet by mouth Daily., Disp: 90 tablet, Rfl: 1    omeprazole (priLOSEC) 40 MG capsule, Take 1 capsule by mouth Daily., Disp: 90 capsule, Rfl: 1    venlafaxine XR (EFFEXOR-XR) 37.5 MG 24 hr capsule, TAKE 1 CAPSULE BY MOUTH DAILY, Disp: 30 capsule, Rfl: 0    venlafaxine XR (EFFEXOR-XR) 75 MG 24 hr capsule, Take 1 capsule by mouth Daily., Disp: 90 capsule, Rfl: 1    Allergies:   No Known Allergies    Objective   Vital Signs:   Vitals:    08/27/24 1347   BP: 108/84   BP Location: Left arm   Patient Position: Sitting   Pulse: 89   SpO2: 98%   Weight: 99.8 kg (220 lb)   Height: 188 cm (74\")       PHYSICAL EXAM  General appearance: Awake, alert, cooperative  Head: Normocephalic, without obvious abnormality, atraumatic  Eyes: Conjunctivae/corneas clear, EOMs intact  Neck: no adenopathy, no carotid bruit, no JVD, and thyroid: not enlarged  Lungs: clear to auscultation bilaterally and no rhonchi or crackles\", ' symmetric  Heart: regular rate and rhythm, S1, S2 normal, no murmur, click, rub or gallop  Abdomen: Soft, non-tender, bowel sounds normal,  no organomegaly  Extremities: extremities normal, atraumatic, no cyanosis or edema  Skin: Skin color, turgor normal, no rashes or lesions  Neurologic: Grossly normal     Lab Results   Component Value Date    GLUCOSE 92 05/08/2024    CALCIUM 9.9 05/08/2024     05/08/2024    K 3.9 05/08/2024    CO2 27.0 05/08/2024     05/08/2024    BUN 10 05/08/2024    CREATININE 1.08 05/08/2024    EGFRIFNONA 89 01/26/2022    BCR 9.3 05/08/2024    ANIONGAP 10.0 05/08/2024     Lab Results   Component Value Date    WBC 4.65 05/08/2024    HGB 15.5 05/08/2024    HCT " "45.8 05/08/2024    MCV 89.1 05/08/2024     05/08/2024     No results found for: \"INR\", \"PROTIME\"  Lab Results   Component Value Date    TSH 1.370 02/22/2024       Cardiac Testing:     I personally viewed and interpreted the patient's EKG/Telemetry/lab data    Procedures    Tobacco Cessation: N/A  Obstructive Sleep Apnea Screening: Completed    Advance Care Planning   ACP discussion was declined by the patient. Patient does not have an advance directive, declines further assistance.       Assessment & Plan    Diagnoses and all orders for this visit:    1. Paroxysmal atrial fibrillation (Primary)    2. Chronic anticoagulation      EKG shows sinus rhythm today   Diagnosis Plan   1. Paroxysmal atrial fibrillation  Status post catheter ablation.  No recurrence of any symptoms consistent with A-fib.  He feels great.    [id number] equals 1 a history in the chart of hypertension.  He is working aggressively towards losing weight and is essentially normotensive all the time now.  We will remove this from his medical record and consider him to have a [id number] of 0      2. Chronic anticoagulation  Stop anticoagulation.   Follow-up with me scheduled     Body mass index is 28.25 kg/m².    I spent 37 minutes in consultation with this patient which included more than 65% of this time in direct face-to-face counseling, physical examination and discussion of my assessment and findings and this shared decision making with the patient.  The remainder of the time not spent face-to-face was performing one, some or all of the following actions: preparing to see the patient (e.g. reviewing tests, prior clinicians' notes, etc), ordering medications, tests or procedures, coordination of care, discussion of the plan with other healthcare providers, documenting clinical information in epic as well as independently interpreting results and communication of these results to the patient family and/or caregiver(s).  Please note that " this explicitly excludes time spent on other separate billable services such as performing procedures or test interpretation, when applicable.      Follow Up:       Thank you for allowing me to participate in the care of your patient. Please to not hesitate to contact me with additional questions or concerns.      Drew Batres DO, FACC, RS  Cardiac Electrophysiologist  [facility] / [facility]

## 2024-09-18 DIAGNOSIS — F41.9 ANXIETY: ICD-10-CM

## 2024-09-18 RX ORDER — VENLAFAXINE HYDROCHLORIDE 37.5 MG/1
37.5 CAPSULE, EXTENDED RELEASE ORAL DAILY
Qty: 10 CAPSULE | Refills: 0 | Status: SHIPPED | OUTPATIENT
Start: 2024-09-18

## 2024-10-04 DIAGNOSIS — F41.9 ANXIETY: ICD-10-CM

## 2024-10-07 RX ORDER — VENLAFAXINE HYDROCHLORIDE 37.5 MG/1
CAPSULE, EXTENDED RELEASE ORAL
Qty: 30 CAPSULE | Refills: 0 | Status: SHIPPED | OUTPATIENT
Start: 2024-10-07

## 2024-11-01 DIAGNOSIS — I10 ESSENTIAL HYPERTENSION: ICD-10-CM

## 2024-11-01 NOTE — TELEPHONE ENCOUNTER
Caller: Daniele Bowers    Relationship: Self    Best call back number: [phone]     Requested Prescriptions:   Requested Prescriptions     Pending Prescriptions Disp Refills    losartan (COZAAR) 50 MG tablet 90 tablet 1     Sig: Take 1 tablet by mouth Daily.        [id number] [place], [place] - [address] AT [facility] [phone] [facility] [phone]      Last office visit with prescribing clinician: 5/1/2024   Last telemedicine visit with prescribing clinician: Visit date not found   Next office visit with prescribing clinician: Visit date not found     Additional details provided by patient: PATIENT IS COMPLETELY OUT OF MEDICATION. PATIENT HAS BEEN TRYING TO GET MEDICATION REFILLED THROUGH PHARMACY REQUESTS BUT HAS NOT HEARD BACK. PLEASE ADVISE.    Does the patient have less than a 3 day supply:  [x] Yes  [] No    Would you like a call back once the refill request has been completed: [] Yes [] No    If the office needs to give you a call back, can they leave a voicemail: [] Yes [] No    Elliott Kauffman Rep   11/01/24 09:21 EDT

## 2024-11-02 DIAGNOSIS — I10 ESSENTIAL HYPERTENSION: ICD-10-CM

## 2024-11-04 DIAGNOSIS — I10 ESSENTIAL HYPERTENSION: ICD-10-CM

## 2024-11-04 RX ORDER — LOSARTAN POTASSIUM 50 MG/1
50 TABLET ORAL DAILY
Qty: 90 TABLET | Refills: 0 | Status: SHIPPED | OUTPATIENT
Start: 2024-11-04

## 2024-11-04 RX ORDER — LOSARTAN POTASSIUM 50 MG/1
50 TABLET ORAL DAILY
Qty: 90 TABLET | Refills: 0 | Status: CANCELLED | OUTPATIENT
Start: 2024-11-04

## 2024-11-05 RX ORDER — LOSARTAN POTASSIUM 50 MG/1
50 TABLET ORAL DAILY
Qty: 90 TABLET | Refills: 1 | OUTPATIENT
Start: 2024-11-05

## 2024-11-05 NOTE — TELEPHONE ENCOUNTER
"Relay     \"Tried calling patient, Lvm to return call,Needing to schedule a follow up appt \"                "

## 2024-11-09 DIAGNOSIS — F41.9 ANXIETY: ICD-10-CM

## 2024-11-11 RX ORDER — VENLAFAXINE HYDROCHLORIDE 37.5 MG/1
37.5 CAPSULE, EXTENDED RELEASE ORAL DAILY
Qty: 30 CAPSULE | Refills: 0 | Status: SHIPPED | OUTPATIENT
Start: 2024-11-11 | End: 2024-11-18 | Stop reason: DRUGHIGH

## 2024-11-18 ENCOUNTER — OFFICE VISIT (OUTPATIENT)
Dept: FAMILY MEDICINE CLINIC | Facility: CLINIC | Age: 44
End: 2024-11-18
Payer: COMMERCIAL

## 2024-11-18 VITALS
HEART RATE: 91 BPM | WEIGHT: 233.6 LBS | TEMPERATURE: 98.8 F | BODY MASS INDEX: 29.98 KG/M2 | SYSTOLIC BLOOD PRESSURE: 119 MMHG | HEIGHT: 74 IN | DIASTOLIC BLOOD PRESSURE: 69 MMHG | OXYGEN SATURATION: 97 %

## 2024-11-18 DIAGNOSIS — Z23 FLU VACCINE NEED: ICD-10-CM

## 2024-11-18 DIAGNOSIS — I10 PRIMARY HYPERTENSION: Chronic | ICD-10-CM

## 2024-11-18 DIAGNOSIS — F41.9 ANXIETY: ICD-10-CM

## 2024-11-18 DIAGNOSIS — K21.9 GASTROESOPHAGEAL REFLUX DISEASE WITHOUT ESOPHAGITIS: ICD-10-CM

## 2024-11-18 DIAGNOSIS — Z00.00 ANNUAL PHYSICAL EXAM: Primary | ICD-10-CM

## 2024-11-18 PROCEDURE — 99396 PREV VISIT EST AGE 40-64: CPT | Performed by: NURSE PRACTITIONER

## 2024-11-18 PROCEDURE — 90471 IMMUNIZATION ADMIN: CPT | Performed by: NURSE PRACTITIONER

## 2024-11-18 PROCEDURE — 90656 IIV3 VACC NO PRSV 0.5 ML IM: CPT | Performed by: NURSE PRACTITIONER

## 2024-11-18 RX ORDER — VENLAFAXINE HYDROCHLORIDE 37.5 MG/1
37.5 CAPSULE, EXTENDED RELEASE ORAL DAILY
Qty: 90 CAPSULE | Refills: 1 | Status: CANCELLED | OUTPATIENT
Start: 2024-11-18

## 2024-11-18 RX ORDER — TESTOSTERONE CYPIONATE 200 MG/ML
100 INJECTION, SOLUTION INTRAMUSCULAR
COMMUNITY
Start: 2024-11-15

## 2024-11-18 RX ORDER — VENLAFAXINE HYDROCHLORIDE 75 MG/1
CAPSULE, EXTENDED RELEASE ORAL
Qty: 180 CAPSULE | Refills: 1 | Status: SHIPPED | OUTPATIENT
Start: 2024-11-18

## 2024-11-18 RX ORDER — HYDROCHLOROTHIAZIDE 25 MG/1
25 TABLET ORAL DAILY
Qty: 90 TABLET | Refills: 1 | Status: SHIPPED | OUTPATIENT
Start: 2024-11-18

## 2024-11-18 NOTE — PROGRESS NOTES
Chief Complaint    Annual physical exam   Follow-up (6 months), Anxiety, Hypertension, Atrial Fibrillation, and Heartburn    SUBJECTIVE  Daniele Bowers presents to [facility]      Annual physical exam     Hypertension/Atrial Fibrillation:  Patient is taking Losartan, HCTZ.  Patient's Blood Pressure in clinic today is 119/69.  Patient does monitor blood pressure at home with readings of 110s-120s/60s-70s.  Patient denies chest pain, shortness of air, headache, flushing, abnormal swelling in feet/ankles.  Patient's cardiologist is Dr. Batres.    Gastroesophageal Reflux:  Patient is taking Omeprazole, with good control of symptoms.  Patient does not need over the counter medications for breakthrough symptoms.  Patient tries to avoid trigger foods, eat frequent small meals, not lie down within 2 hours of eating, avoids NSAIDS medications and alcohol.    Anxiety:  Patient is taking Venlafaxine.  Patient stopped taking Buspirone because he did not feel like it was helping his symptoms.  Patient does still report a significant amount of anxiety.    History of Present Illness  Past Medical History:   Diagnosis Date    Allergies     Anxiety     GERD (gastroesophageal reflux disease)     Hypertension     Paroxysmal atrial fibrillation 01/25/2022      Family History   Problem Relation Age of Onset    Diabetes Mother     Diabetes Father     Hypertension Father     Heart disease Other       Past Surgical History:   Procedure Laterality Date    APPENDECTOMY      CARDIAC ELECTROPHYSIOLOGY PROCEDURE N/A 5/9/2024    Procedure: PVA (paroxysmal) w/PFA, hold Sotalol 3 days, hold Eliquis morning of;  Surgeon: Drew Batres DO;  Location: [facility] INVASIVE LOCATION;  Service: Cardiovascular;  Laterality: N/A;    ENDOSCOPY          Current Outpatient Medications:     hydroCHLOROthiazide 25 MG tablet, Take 1 tablet by mouth Daily., Disp: 90 tablet, Rfl: 1    levocetirizine (XYZAL) 5 MG tablet, Take  "1 tablet by mouth Every Evening., Disp: , Rfl:     losartan (COZAAR) 50 MG tablet, Take 1 tablet by mouth Daily., Disp: 90 tablet, Rfl: 0    omeprazole (priLOSEC) 40 MG capsule, TAKE 1 CAPSULE BY MOUTH DAILY, Disp: 90 capsule, Rfl: 1    Testosterone Cypionate (DEPOTESTOTERONE CYPIONATE) 200 MG/ML injection, Inject 0.5 mL into the appropriate muscle as directed by prescriber Every 14 (Fourteen) Days., Disp: , Rfl:     venlafaxine XR (EFFEXOR-XR) 75 MG 24 hr capsule, Take 2 tablets daily, Disp: 180 capsule, Rfl: 1    OBJECTIVE  Vital Signs:   /69 (BP Location: Left arm, Patient Position: Sitting, Cuff Size: Large Adult)   Pulse 91   Temp 98.8 °F (37.1 °C) (Temporal)   Ht 188 cm (74\")   Wt 106 kg (233 lb 9.6 oz)   SpO2 97%   BMI 29.99 kg/m²    Estimated body mass index is 29.99 kg/m² as calculated from the following:    Height as of this encounter: 188 cm (74\").    Weight as of this encounter: 106 kg (233 lb 9.6 oz).     Wt Readings from Last 3 Encounters:   11/18/24 106 kg (233 lb 9.6 oz)   08/27/24 99.8 kg (220 lb)   05/09/24 102 kg (224 lb 9.6 oz)     BP Readings from Last 3 Encounters:   11/18/24 119/69   08/27/24 108/84   05/09/24 118/73       Physical Exam  Vitals reviewed.   Constitutional:       Appearance: Normal appearance. He is well-developed.   HENT:      Head: Normocephalic and atraumatic.      Right Ear: External ear normal.      Left Ear: External ear normal.      Mouth/Throat:      Pharynx: No oropharyngeal exudate.   Eyes:      Conjunctiva/sclera: Conjunctivae normal.      Pupils: Pupils are equal, round, and reactive to light.   Neck:      Vascular: No carotid bruit.   Cardiovascular:      Rate and Rhythm: Normal rate and regular rhythm.      Pulses: Normal pulses.      Heart sounds: Normal heart sounds. No murmur heard.     No friction rub. No gallop.   Pulmonary:      Effort: Pulmonary effort is normal.      Breath sounds: Normal breath sounds. No wheezing or rhonchi.   Skin:     " General: Skin is warm and dry.   Neurological:      Mental Status: He is alert and oriented to person, place, and time.      Cranial Nerves: No cranial nerve deficit.   Psychiatric:         Mood and Affect: Mood and affect normal.         Behavior: Behavior normal.         Thought Content: Thought content normal.         Judgment: Judgment normal.          Result Review        No Images in the past 120 days found..      The above data has been reviewed by ADALBERTO Salazar 11/18/2024 15:06 EST.          Patient Care Team:  Penelope An APRN as PCP - General (Family Medicine)  Drew Batres DO as Consulting Physician (Cardiology)  Tony Leon MD as Consulting Physician (Cardiology)            ASSESSMENT & PLAN    Diagnoses and all orders for this visit:    1. Annual physical exam (Primary)  -     Comprehensive Metabolic Panel; Future  -     CBC & Differential; Future  -     TSH; Future  -     Lipid Panel; Future    2. Anxiety  Comments:  Not at goal, increase Effexor or to 150 mg daily, patient will take (2) 75 mg tablets  Orders:  -     venlafaxine XR (EFFEXOR-XR) 75 MG 24 hr capsule; Take 2 tablets daily  Dispense: 180 capsule; Refill: 1    3. Gastroesophageal reflux disease without esophagitis  Comments:  Symptoms well controlled with current medication regimen, cont. Current meds.    4. Primary hypertension  Comments:  BP well-controlled, continue current medication  Orders:  -     hydroCHLOROthiazide 25 MG tablet; Take 1 tablet by mouth Daily.  Dispense: 90 tablet; Refill: 1    5. Flu vaccine need  -     Fluzone >6mos ([id number])     “Discussed risks/benefits to vaccination, reviewed components of the vaccine, discussed VIS, discussed informed consent, informed consent obtained. Patient/Parent was allowed to accept or refuse vaccine. Questions answered to satisfactory state of patient/Parent. We reviewed typical age appropriate and seasonally appropriate vaccinations. Reviewed immunization  history and updated state vaccination form as needed. Patient was counseled on Influenza    The patient is advised to continue current medications, continue current healthy lifestyle patterns, and return for routine annual checkups.    Tobacco Use: Low Risk  (11/18/2024)    Patient History     Smoking Tobacco Use: Never     Smokeless Tobacco Use: Never     Passive Exposure: Never       Follow Up     Return in about 3 months (around 2/18/2025).      Patient was given instructions and counseling regarding his condition or for health maintenance advice. Please see specific information pulled into the AVS if appropriate.   I have reviewed information obtained and documented by others and I have confirmed the accuracy of this documented note.    Penelope An, APRN

## 2024-12-12 DIAGNOSIS — I10 PRIMARY HYPERTENSION: Chronic | ICD-10-CM

## 2024-12-12 RX ORDER — HYDROCHLOROTHIAZIDE 25 MG/1
25 TABLET ORAL DAILY
Qty: 30 TABLET | Refills: 1 | Status: SHIPPED | OUTPATIENT
Start: 2024-12-12

## 2024-12-26 DIAGNOSIS — F41.9 ANXIETY: ICD-10-CM

## 2024-12-26 RX ORDER — VENLAFAXINE HYDROCHLORIDE 37.5 MG/1
37.5 CAPSULE, EXTENDED RELEASE ORAL DAILY
Qty: 30 CAPSULE | Refills: 0 | OUTPATIENT
Start: 2024-12-26

## 2025-01-08 DIAGNOSIS — I10 ESSENTIAL HYPERTENSION: ICD-10-CM

## 2025-01-08 RX ORDER — LOSARTAN POTASSIUM 50 MG/1
50 TABLET ORAL DAILY
Qty: 90 TABLET | Refills: 0 | Status: SHIPPED | OUTPATIENT
Start: 2025-01-08

## 2025-01-28 DIAGNOSIS — K21.9 GASTROESOPHAGEAL REFLUX DISEASE WITHOUT ESOPHAGITIS: ICD-10-CM

## 2025-01-28 RX ORDER — OMEPRAZOLE 40 MG/1
40 CAPSULE, DELAYED RELEASE ORAL DAILY
Qty: 90 CAPSULE | Refills: 1 | OUTPATIENT
Start: 2025-01-28

## 2025-03-04 ENCOUNTER — TELEPHONE (OUTPATIENT)
Dept: FAMILY MEDICINE CLINIC | Facility: CLINIC | Age: 45
End: 2025-03-04
Payer: COMMERCIAL

## 2025-03-04 NOTE — TELEPHONE ENCOUNTER
"Relay     \"Left VM to return call. Patient is due for lab work ordered by Penelope An. Patient needs to go to the lab at earliest convenience.   CBC & Differential  Comprehensive Metabolic Panel  Lipid Panel  TSH\"                "

## 2025-03-05 NOTE — TELEPHONE ENCOUNTER
Name: Daniele Bowers    Relationship: Self    Best Callback Number: [phone]     HUB PROVIDED THE RELAY MESSAGE FROM THE OFFICE   PATIENT VOICED UNDERSTANDING AND HAS NO FURTHER QUESTIONS AT THIS TIME    ADDITIONAL INFORMATION:

## 2025-03-24 ENCOUNTER — LAB (OUTPATIENT)
Dept: LAB | Facility: HOSPITAL | Age: 45
End: 2025-03-24
Payer: COMMERCIAL

## 2025-03-24 DIAGNOSIS — Z00.00 ANNUAL PHYSICAL EXAM: ICD-10-CM

## 2025-03-24 LAB
ALBUMIN SERPL-MCNC: 4.4 G/DL (ref 3.5–5.2)
ALBUMIN/GLOB SERPL: 1.5 G/DL
ALP SERPL-CCNC: 68 U/L (ref 39–117)
ALT SERPL W P-5'-P-CCNC: 12 U/L (ref 1–41)
ANION GAP SERPL CALCULATED.3IONS-SCNC: 10 MMOL/L (ref 5–15)
AST SERPL-CCNC: 15 U/L (ref 1–40)
BASOPHILS # BLD AUTO: 0.06 10*3/MM3 (ref 0–0.2)
BASOPHILS NFR BLD AUTO: 1.2 % (ref 0–1.5)
BILIRUB SERPL-MCNC: 0.3 MG/DL (ref 0–1.2)
BUN SERPL-MCNC: 16 MG/DL (ref 6–20)
BUN/CREAT SERPL: 14.2 (ref 7–25)
CALCIUM SPEC-SCNC: 9.9 MG/DL (ref 8.6–10.5)
CHLORIDE SERPL-SCNC: 101 MMOL/L (ref 98–107)
CHOLEST SERPL-MCNC: 180 MG/DL (ref 0–200)
CO2 SERPL-SCNC: 27 MMOL/L (ref 22–29)
CREAT SERPL-MCNC: 1.13 MG/DL (ref 0.76–1.27)
DEPRECATED RDW RBC AUTO: 45.2 FL (ref 37–54)
EGFRCR SERPLBLD CKD-EPI 2021: 82.7 ML/MIN/1.73
EOSINOPHIL # BLD AUTO: 0.13 10*3/MM3 (ref 0–0.4)
EOSINOPHIL NFR BLD AUTO: 2.6 % (ref 0.3–6.2)
ERYTHROCYTE [DISTWIDTH] IN BLOOD BY AUTOMATED COUNT: 14.2 % (ref 12.3–15.4)
GLOBULIN UR ELPH-MCNC: 2.9 GM/DL
GLUCOSE SERPL-MCNC: 95 MG/DL (ref 65–99)
HCT VFR BLD AUTO: 46.3 % (ref 37.5–51)
HDLC SERPL-MCNC: 32 MG/DL (ref 40–60)
HGB BLD-MCNC: 15.6 G/DL (ref 13–17.7)
IMM GRANULOCYTES # BLD AUTO: 0.01 10*3/MM3 (ref 0–0.05)
IMM GRANULOCYTES NFR BLD AUTO: 0.2 % (ref 0–0.5)
LDLC SERPL CALC-MCNC: 126 MG/DL (ref 0–100)
LDLC/HDLC SERPL: 3.88 {RATIO}
LYMPHOCYTES # BLD AUTO: 1.68 10*3/MM3 (ref 0.7–3.1)
LYMPHOCYTES NFR BLD AUTO: 34 % (ref 19.6–45.3)
MCH RBC QN AUTO: 29.2 PG (ref 26.6–33)
MCHC RBC AUTO-ENTMCNC: 33.7 G/DL (ref 31.5–35.7)
MCV RBC AUTO: 86.5 FL (ref 79–97)
MONOCYTES # BLD AUTO: 0.45 10*3/MM3 (ref 0.1–0.9)
MONOCYTES NFR BLD AUTO: 9.1 % (ref 5–12)
NEUTROPHILS NFR BLD AUTO: 2.61 10*3/MM3 (ref 1.7–7)
NEUTROPHILS NFR BLD AUTO: 52.9 % (ref 42.7–76)
NRBC BLD AUTO-RTO: 0 /100 WBC (ref 0–0.2)
PLATELET # BLD AUTO: 383 10*3/MM3 (ref 140–450)
PMV BLD AUTO: 8.9 FL (ref 6–12)
POTASSIUM SERPL-SCNC: 4.3 MMOL/L (ref 3.5–5.2)
PROT SERPL-MCNC: 7.3 G/DL (ref 6–8.5)
RBC # BLD AUTO: 5.35 10*6/MM3 (ref 4.14–5.8)
SODIUM SERPL-SCNC: 138 MMOL/L (ref 136–145)
TRIGL SERPL-MCNC: 120 MG/DL (ref 0–150)
TSH SERPL DL<=0.05 MIU/L-ACNC: 1.91 UIU/ML (ref 0.27–4.2)
VLDLC SERPL-MCNC: 22 MG/DL (ref 5–40)
WBC NRBC COR # BLD AUTO: 4.94 10*3/MM3 (ref 3.4–10.8)

## 2025-03-24 PROCEDURE — 36415 COLL VENOUS BLD VENIPUNCTURE: CPT

## 2025-03-24 PROCEDURE — 80050 GENERAL HEALTH PANEL: CPT

## 2025-03-24 PROCEDURE — 80061 LIPID PANEL: CPT

## 2025-03-26 ENCOUNTER — OFFICE VISIT (OUTPATIENT)
Dept: FAMILY MEDICINE CLINIC | Facility: CLINIC | Age: 45
End: 2025-03-26
Payer: COMMERCIAL

## 2025-03-26 VITALS
WEIGHT: 233 LBS | HEIGHT: 74 IN | BODY MASS INDEX: 29.9 KG/M2 | DIASTOLIC BLOOD PRESSURE: 71 MMHG | OXYGEN SATURATION: 96 % | TEMPERATURE: 98.7 F | HEART RATE: 82 BPM | SYSTOLIC BLOOD PRESSURE: 111 MMHG

## 2025-03-26 DIAGNOSIS — I10 PRIMARY HYPERTENSION: Primary | Chronic | ICD-10-CM

## 2025-03-26 DIAGNOSIS — F41.9 ANXIETY: ICD-10-CM

## 2025-03-26 DIAGNOSIS — E78.2 MIXED HYPERLIPIDEMIA: ICD-10-CM

## 2025-03-26 DIAGNOSIS — K21.9 GASTROESOPHAGEAL REFLUX DISEASE WITHOUT ESOPHAGITIS: ICD-10-CM

## 2025-03-26 PROBLEM — Z79.01 CHRONIC ANTICOAGULATION: Status: RESOLVED | Noted: 2024-08-26 | Resolved: 2025-03-26

## 2025-03-26 PROCEDURE — 99214 OFFICE O/P EST MOD 30 MIN: CPT | Performed by: NURSE PRACTITIONER

## 2025-03-26 RX ORDER — VENLAFAXINE HYDROCHLORIDE 150 MG/1
150 CAPSULE, EXTENDED RELEASE ORAL DAILY
Qty: 90 CAPSULE | Refills: 1 | Status: SHIPPED | OUTPATIENT
Start: 2025-03-26

## 2025-03-26 NOTE — PROGRESS NOTES
"Chief Complaint  Follow-up (4 months) and Anxiety    SUBJECTIVE  Daniele Bowers presents to [facility]    Anxiety:  Patient is taking Venlafaxine - dose increased at last office visit.  Patient states he is doing very well on the  new dose.        History of Present Illness  Past Medical History:   Diagnosis Date    Allergies     Anxiety     GERD (gastroesophageal reflux disease)     Hypertension     Paroxysmal atrial fibrillation 01/25/2022      Family History   Problem Relation Age of Onset    Diabetes Mother     Diabetes Father     Hypertension Father     Heart disease Other       Past Surgical History:   Procedure Laterality Date    APPENDECTOMY      CARDIAC ELECTROPHYSIOLOGY PROCEDURE N/A 5/9/2024    Procedure: PVA (paroxysmal) w/PFA, hold Sotalol 3 days, hold Eliquis morning of;  Surgeon: Drew Batres DO;  Location: [facility] INVASIVE LOCATION;  Service: Cardiovascular;  Laterality: N/A;    ENDOSCOPY          Current Outpatient Medications:     hydroCHLOROthiazide 25 MG tablet, TAKE 1 TABLET BY MOUTH DAILY, Disp: 30 tablet, Rfl: 1    levocetirizine (XYZAL) 5 MG tablet, Take 1 tablet by mouth Every Evening., Disp: , Rfl:     losartan (COZAAR) 50 MG tablet, TAKE 1 TABLET BY MOUTH DAILY, Disp: 90 tablet, Rfl: 0    omeprazole (priLOSEC) 40 MG capsule, TAKE 1 CAPSULE BY MOUTH DAILY, Disp: 90 capsule, Rfl: 1    Testosterone Cypionate (DEPOTESTOTERONE CYPIONATE) 200 MG/ML injection, Inject 0.5 mL into the appropriate muscle as directed by prescriber Every 14 (Fourteen) Days., Disp: , Rfl:     venlafaxine XR (EFFEXOR-XR) 150 MG 24 hr capsule, Take 1 capsule by mouth Daily., Disp: 90 capsule, Rfl: 1    OBJECTIVE  Vital Signs:   /71 (BP Location: Left arm, Patient Position: Sitting, Cuff Size: Large Adult)   Pulse 82   Temp 98.7 °F (37.1 °C) (Temporal)   Ht 188 cm (74\")   Wt 106 kg (233 lb)   SpO2 96%   BMI 29.92 kg/m²    Estimated body mass index is 29.92 kg/m² as " "calculated from the following:    Height as of this encounter: 188 cm (74\").    Weight as of this encounter: 106 kg (233 lb).     Wt Readings from Last 3 Encounters:   03/26/25 106 kg (233 lb)   11/18/24 106 kg (233 lb 9.6 oz)   08/27/24 99.8 kg (220 lb)     BP Readings from Last 3 Encounters:   03/26/25 111/71   11/18/24 119/69   08/27/24 108/84       Physical Exam  Vitals reviewed.   Constitutional:       Appearance: Normal appearance. He is well-developed.   HENT:      Head: Normocephalic and atraumatic.      Right Ear: External ear normal.      Left Ear: External ear normal.      Mouth/Throat:      Pharynx: No oropharyngeal exudate.   Eyes:      Conjunctiva/sclera: Conjunctivae normal.      Pupils: Pupils are equal, round, and reactive to light.   Neck:      Vascular: No carotid bruit.   Cardiovascular:      Rate and Rhythm: Normal rate and regular rhythm.      Pulses: Normal pulses.      Heart sounds: Normal heart sounds. No murmur heard.     No friction rub. No gallop.   Pulmonary:      Effort: Pulmonary effort is normal.      Breath sounds: Normal breath sounds. No wheezing or rhonchi.   Skin:     General: Skin is warm and dry.   Neurological:      Mental Status: He is alert and oriented to person, place, and time.      Cranial Nerves: No cranial nerve deficit.   Psychiatric:         Mood and Affect: Mood and affect normal.         Behavior: Behavior normal.         Thought Content: Thought content normal.         Judgment: Judgment normal.          Result Review    CMP          5/8/2024    14:50 3/24/2025    06:47   CMP   Glucose 92  95    BUN 10  16    Creatinine 1.08  1.13    EGFR 87.9  82.7    Sodium 140  138    Potassium 3.9  4.3    Chloride 103  101    Calcium 9.9  9.9    Total Protein  7.3    Albumin  4.4    Globulin  2.9    Total Bilirubin  0.3    Alkaline Phosphatase  68    AST (SGOT)  15    ALT (SGPT)  12    Albumin/Globulin Ratio  1.5    BUN/Creatinine Ratio 9.3  14.2    Anion Gap 10.0  10.0  "     CBC          5/8/2024    14:50 3/24/2025    06:47   CBC   WBC 4.65  4.94    RBC 5.14  5.35    Hemoglobin 15.5  15.6    Hematocrit 45.8  46.3    MCV 89.1  86.5    MCH 30.2  29.2    MCHC 33.8  33.7    RDW 11.8  14.2    Platelets 337  383      Lipid Panel          3/24/2025    06:47   Lipid Panel   Total Cholesterol 180    Triglycerides 120    HDL Cholesterol 32    VLDL Cholesterol 22    LDL Cholesterol  126    LDL/HDL Ratio 3.88      TSH          3/24/2025    06:47   TSH   TSH 1.910        No Images in the past 120 days found..      The above data has been reviewed by ADALBERTO Salazar 03/26/2025 14:37 EDT.          Patient Care Team:  Penelope An APRN as PCP - General (Family Medicine)  Drew Batres DO as Consulting Physician (Cardiology)  Tony Leon MD as Consulting Physician (Cardiology)            ASSESSMENT & PLAN    Diagnoses and all orders for this visit:    1. Primary hypertension (Primary)  Comments:  BP well-controlled, continue current medication    2. Mixed hyperlipidemia  Comments:  LDL slightly worse, continue diet control.    3. Gastroesophageal reflux disease without esophagitis  Comments:  Symptoms well controlled with current medication regimen, cont. Current meds.    4. Anxiety  Comments:  Doing well on current dose of Effexor 150 mg, continue medication.  Prescription refill sent  Orders:  -     venlafaxine XR (EFFEXOR-XR) 150 MG 24 hr capsule; Take 1 capsule by mouth Daily.  Dispense: 90 capsule; Refill: 1         Tobacco Use: Low Risk  (3/26/2025)    Patient History     Smoking Tobacco Use: Never     Smokeless Tobacco Use: Never     Passive Exposure: Never       Follow Up     Return in about 6 months (around 9/26/2025).      Patient was given instructions and counseling regarding his condition or for health maintenance advice. Please see specific information pulled into the AVS if appropriate.   I have reviewed information obtained and documented by others and I have  confirmed the accuracy of this documented note.    Penelope An, APRN

## 2025-04-08 DIAGNOSIS — I10 ESSENTIAL HYPERTENSION: ICD-10-CM

## 2025-04-08 RX ORDER — LOSARTAN POTASSIUM 50 MG/1
50 TABLET ORAL DAILY
Qty: 90 TABLET | Refills: 1 | Status: SHIPPED | OUTPATIENT
Start: 2025-04-08

## 2025-05-21 ENCOUNTER — TELEPHONE (OUTPATIENT)
Dept: CARDIOLOGY | Facility: CLINIC | Age: 45
End: 2025-05-21

## 2025-05-21 NOTE — TELEPHONE ENCOUNTER
Name: Daniele Bowers    Relationship: Self    Best Callback Number: [phone]    Incoming call to the Hub, requesting to  Reschedule their Other: PVA  appointment on 05/27/25.     Per [facility] workflow, further review is needed before the task can be completed.    Result of Call: Please reach out to the patient to reschedule

## 2025-07-07 DIAGNOSIS — I10 ESSENTIAL HYPERTENSION: ICD-10-CM

## 2025-07-07 DIAGNOSIS — K21.9 GASTROESOPHAGEAL REFLUX DISEASE WITHOUT ESOPHAGITIS: ICD-10-CM

## 2025-07-07 RX ORDER — OMEPRAZOLE 40 MG/1
40 CAPSULE, DELAYED RELEASE ORAL DAILY
Qty: 90 CAPSULE | Refills: 1 | Status: SHIPPED | OUTPATIENT
Start: 2025-07-07

## 2025-07-07 RX ORDER — LOSARTAN POTASSIUM 50 MG/1
50 TABLET ORAL DAILY
Qty: 90 TABLET | Refills: 1 | Status: SHIPPED | OUTPATIENT
Start: 2025-07-07

## 2025-07-11 DIAGNOSIS — I10 PRIMARY HYPERTENSION: Chronic | ICD-10-CM

## 2025-07-11 RX ORDER — HYDROCHLOROTHIAZIDE 25 MG/1
25 TABLET ORAL DAILY
Qty: 30 TABLET | Refills: 1 | Status: SHIPPED | OUTPATIENT
Start: 2025-07-11

## (undated) DEVICE — ELECTRD RETRN/GRND MEGADYNE SGL/PLT W/CORD 9FT DISP

## (undated) DEVICE — ADULT, W/LG. BACK PAD, RADIOTRANSPARENT ELEMENT AND LEAD WIRE COMPATIBLE W/: Brand: DEFIBRILLATION ELECTRODES

## (undated) DEVICE — DOME MONITORING W BONDED STPCK BIOTRANS2

## (undated) DEVICE — Device: Brand: MEDEX

## (undated) DEVICE — STEERABLE SHEATH CLEAR: Brand: FARADRIVE™

## (undated) DEVICE — Device: Brand: WEBSTER CS

## (undated) DEVICE — DRSNG SURESITE123 4X4.8IN

## (undated) DEVICE — STERILE (15.2 TAPERED TO 7.6 X 183CM) POLYETHYLENE ACCORDION-FOLDED COVER FOR USE WITH SIEMENS ACUNAV ULTRASOUND CATHETER FAMILY CONNECTOR: Brand: SWIFTLINK TRANSDUCER COVER

## (undated) DEVICE — LEX ELECTRO PHYSIOLOGY: Brand: MEDLINE INDUSTRIES, INC.

## (undated) DEVICE — 1 X VERSACROSS CONNECT TRANSSEPTAL DILATOR;  1 X VERSACROSS RF WIRE (INCLUDING 1 X CONNECTOR CABLE (SINGLE USE)): Brand: VERSACROSS CONNECT ACCESS SOLUTION FOR FARADRIVE

## (undated) DEVICE — SOL NACL 0.9PCT 1000ML

## (undated) DEVICE — PRESSURE MONITORING SET: Brand: TRUWAVE

## (undated) DEVICE — SET PRIMARY GRVTY 10DP MALE LL 104IN

## (undated) DEVICE — INTRO SHEATH ENGAGE W/50 GW .038 8F12

## (undated) DEVICE — ST INF PRI SMRTSTE 20DRP 2VLV 24ML 117

## (undated) DEVICE — ST EXT IV SMARTSITE PINCH/CLMP 5ML 46CM

## (undated) DEVICE — ST EXT IV SMRTSTE 2VLV FIX M LL 6ML 41

## (undated) DEVICE — CATH ULTRASND ECHOCARDIOGRAPHY ACUNAV

## (undated) DEVICE — DECANT BG O JET

## (undated) DEVICE — Device

## (undated) DEVICE — INTRO SHEATH FAST/CATH LG/LUM 11F .038IN 12CM

## (undated) DEVICE — PULSED FIELD ABLATION CATHETER: Brand: FARAWAVE™

## (undated) DEVICE — KT MANIFLD EP